# Patient Record
Sex: FEMALE | Race: WHITE | NOT HISPANIC OR LATINO | Employment: OTHER | RURAL
[De-identification: names, ages, dates, MRNs, and addresses within clinical notes are randomized per-mention and may not be internally consistent; named-entity substitution may affect disease eponyms.]

---

## 2020-04-05 ENCOUNTER — HISTORICAL (OUTPATIENT)
Dept: ADMINISTRATIVE | Facility: HOSPITAL | Age: 50
End: 2020-04-05

## 2020-04-05 LAB
APTT PPP: 31 SECONDS (ref 25.2–37.3)
BASOPHILS # BLD AUTO: 0.06 X10E3/UL (ref 0–0.2)
BASOPHILS NFR BLD AUTO: 0.6 % (ref 0–1)
BUN SERPL-MCNC: 20 MG/DL (ref 7–18)
CALCIUM SERPL-MCNC: 8.3 MG/DL (ref 8.5–10.1)
CHLORIDE SERPL-SCNC: 104 MMOL/L (ref 98–107)
CK MB SERPL-MCNC: 1.5 NG/ML (ref 1–3.6)
CK SERPL-CCNC: 133 U/L (ref 26–192)
CO2 SERPL-SCNC: 28 MMOL/L (ref 21–32)
CREAT SERPL-MCNC: 0.75 MG/DL (ref 0.55–1.02)
D DIMER PPP FEU-MCNC: 0.3 UG/ML (ref 0–0.47)
EOSINOPHIL # BLD AUTO: 0.5 X10E3/UL (ref 0–0.5)
EOSINOPHIL NFR BLD AUTO: 5.4 % (ref 1–4)
ERYTHROCYTE [DISTWIDTH] IN BLOOD BY AUTOMATED COUNT: 14.1 % (ref 11.5–14.5)
GLUCOSE SERPL-MCNC: 256 MG/DL (ref 74–106)
HCT VFR BLD AUTO: 42.1 % (ref 38–47)
HGB BLD-MCNC: 12.5 G/DL (ref 12–16)
IMM GRANULOCYTES # BLD AUTO: 0.04 X10E3/UL (ref 0–0.04)
IMM GRANULOCYTES NFR BLD: 0.4 % (ref 0–0.4)
INR BLD: 0.99 (ref 0–3.3)
LYMPHOCYTES # BLD AUTO: 2.4 X10E3/UL (ref 1–4.8)
LYMPHOCYTES NFR BLD AUTO: 25.8 % (ref 27–41)
MAGNESIUM SERPL-MCNC: 1.7 MG/DL (ref 1.7–2.3)
MCH RBC QN AUTO: 29.4 PG (ref 27–31)
MCHC RBC AUTO-ENTMCNC: 29.7 G/DL (ref 32–36)
MCV RBC AUTO: 99.1 FL (ref 80–96)
MONOCYTES # BLD AUTO: 0.68 X10E3/UL (ref 0–0.8)
MONOCYTES NFR BLD AUTO: 7.3 % (ref 2–6)
MPC BLD CALC-MCNC: 11.4 FL (ref 9.4–12.4)
MYOGLOBIN SERPL-MCNC: 42 NG/ML (ref 13–71)
NEUTROPHILS # BLD AUTO: 5.64 X10E3/UL (ref 1.8–7.7)
NEUTROPHILS NFR BLD AUTO: 60.5 % (ref 53–65)
NRBC # BLD AUTO: 0 X10E3/UL (ref 0–0)
NRBC, AUTO (.00): 0 /100 (ref 0–0)
PLATELET # BLD AUTO: 233 X10E3/UL (ref 150–400)
POTASSIUM SERPL-SCNC: 4.3 MMOL/L (ref 3.5–5.1)
PROTHROMBIN TIME: 13.2 SECONDS (ref 11.7–14.7)
RBC # BLD AUTO: 4.25 X10E6/UL (ref 4.2–5.4)
SODIUM SERPL-SCNC: 139 MMOL/L (ref 136–145)
TROPONIN I SERPL-MCNC: <0.017 NG/ML (ref 0–0.06)
WBC # BLD AUTO: 9.32 X10E3/UL (ref 4.5–11)

## 2023-09-20 LAB
LEFT EYE DM RETINOPATHY: NEGATIVE
RIGHT EYE DM RETINOPATHY: NEGATIVE

## 2024-02-05 ENCOUNTER — HOSPITAL ENCOUNTER (EMERGENCY)
Facility: HOSPITAL | Age: 54
Discharge: HOME | End: 2024-02-05
Attending: STUDENT IN AN ORGANIZED HEALTH CARE EDUCATION/TRAINING PROGRAM
Payer: MEDICARE

## 2024-02-05 VITALS
TEMPERATURE: 98 F | BODY MASS INDEX: 43.4 KG/M2 | HEIGHT: 69 IN | RESPIRATION RATE: 20 BRPM | SYSTOLIC BLOOD PRESSURE: 119 MMHG | HEART RATE: 91 BPM | WEIGHT: 293 LBS | DIASTOLIC BLOOD PRESSURE: 95 MMHG | OXYGEN SATURATION: 96 %

## 2024-02-05 DIAGNOSIS — Z72.0 TOBACCO ABUSE: ICD-10-CM

## 2024-02-05 DIAGNOSIS — R05.9 COUGH: ICD-10-CM

## 2024-02-05 DIAGNOSIS — J44.1 COPD WITH ACUTE EXACERBATION: Primary | ICD-10-CM

## 2024-02-05 LAB
ALBUMIN SERPL BCP-MCNC: 3.4 G/DL (ref 3.5–5.2)
ALP SERPL-CCNC: 109 U/L (ref 55–135)
ALT SERPL W/O P-5'-P-CCNC: 16 U/L (ref 10–44)
ANION GAP SERPL CALC-SCNC: 13 MMOL/L (ref 8–16)
AST SERPL-CCNC: 12 U/L (ref 10–40)
BASOPHILS # BLD AUTO: 0.09 K/UL (ref 0–0.2)
BASOPHILS NFR BLD: 0.8 % (ref 0–1.9)
BILIRUB SERPL-MCNC: 0.3 MG/DL (ref 0.1–1)
BUN SERPL-MCNC: 29 MG/DL (ref 6–20)
CALCIUM SERPL-MCNC: 9.2 MG/DL (ref 8.7–10.5)
CHLORIDE SERPL-SCNC: 105 MMOL/L (ref 95–110)
CO2 SERPL-SCNC: 23 MMOL/L (ref 23–29)
CREAT SERPL-MCNC: 0.9 MG/DL (ref 0.5–1.4)
DIFFERENTIAL METHOD BLD: NORMAL
EOSINOPHIL # BLD AUTO: 0.4 K/UL (ref 0–0.5)
EOSINOPHIL NFR BLD: 3.2 % (ref 0–8)
ERYTHROCYTE [DISTWIDTH] IN BLOOD BY AUTOMATED COUNT: 14.3 % (ref 11.5–14.5)
EST. GFR  (NO RACE VARIABLE): >60 ML/MIN/1.73 M^2
GLUCOSE SERPL-MCNC: 207 MG/DL (ref 70–110)
GROUP A STREP, MOLECULAR: NEGATIVE
HCT VFR BLD AUTO: 39.3 % (ref 37–48.5)
HGB BLD-MCNC: 13.2 G/DL (ref 12–16)
IMM GRANULOCYTES # BLD AUTO: 0.04 K/UL (ref 0–0.04)
IMM GRANULOCYTES NFR BLD AUTO: 0.3 % (ref 0–0.5)
INFLUENZA A, MOLECULAR: NEGATIVE
INFLUENZA B, MOLECULAR: NEGATIVE
LYMPHOCYTES # BLD AUTO: 3.7 K/UL (ref 1–4.8)
LYMPHOCYTES NFR BLD: 32.1 % (ref 18–48)
MCH RBC QN AUTO: 28.8 PG (ref 27–31)
MCHC RBC AUTO-ENTMCNC: 33.6 G/DL (ref 32–36)
MCV RBC AUTO: 86 FL (ref 82–98)
MONOCYTES # BLD AUTO: 0.9 K/UL (ref 0.3–1)
MONOCYTES NFR BLD: 7.6 % (ref 4–15)
NEUTROPHILS # BLD AUTO: 6.4 K/UL (ref 1.8–7.7)
NEUTROPHILS NFR BLD: 56 % (ref 38–73)
NRBC BLD-RTO: 0 /100 WBC
PLATELET # BLD AUTO: 320 K/UL (ref 150–450)
PMV BLD AUTO: 10.7 FL (ref 9.2–12.9)
POTASSIUM SERPL-SCNC: 4.1 MMOL/L (ref 3.5–5.1)
PROT SERPL-MCNC: 6.8 G/DL (ref 6–8.4)
RBC # BLD AUTO: 4.58 M/UL (ref 4–5.4)
SARS-COV-2 RDRP RESP QL NAA+PROBE: NEGATIVE
SODIUM SERPL-SCNC: 141 MMOL/L (ref 136–145)
SPECIMEN SOURCE: NORMAL
WBC # BLD AUTO: 11.43 K/UL (ref 3.9–12.7)

## 2024-02-05 PROCEDURE — 99284 EMERGENCY DEPT VISIT MOD MDM: CPT | Mod: 25

## 2024-02-05 PROCEDURE — 85025 COMPLETE CBC W/AUTO DIFF WBC: CPT | Performed by: NURSE PRACTITIONER

## 2024-02-05 PROCEDURE — 87651 STREP A DNA AMP PROBE: CPT | Performed by: NURSE PRACTITIONER

## 2024-02-05 PROCEDURE — 87502 INFLUENZA DNA AMP PROBE: CPT | Performed by: NURSE PRACTITIONER

## 2024-02-05 PROCEDURE — 80053 COMPREHEN METABOLIC PANEL: CPT | Performed by: NURSE PRACTITIONER

## 2024-02-05 PROCEDURE — 94640 AIRWAY INHALATION TREATMENT: CPT | Mod: XB

## 2024-02-05 PROCEDURE — 25000003 PHARM REV CODE 250: Performed by: NURSE PRACTITIONER

## 2024-02-05 PROCEDURE — 25000242 PHARM REV CODE 250 ALT 637 W/ HCPCS: Performed by: NURSE PRACTITIONER

## 2024-02-05 PROCEDURE — 71046 X-RAY EXAM CHEST 2 VIEWS: CPT | Mod: 26,,, | Performed by: RADIOLOGY

## 2024-02-05 PROCEDURE — 96374 THER/PROPH/DIAG INJ IV PUSH: CPT

## 2024-02-05 PROCEDURE — U0002 COVID-19 LAB TEST NON-CDC: HCPCS | Performed by: NURSE PRACTITIONER

## 2024-02-05 PROCEDURE — 71046 X-RAY EXAM CHEST 2 VIEWS: CPT | Mod: TC

## 2024-02-05 PROCEDURE — 63600175 PHARM REV CODE 636 W HCPCS: Performed by: NURSE PRACTITIONER

## 2024-02-05 RX ORDER — DOXYCYCLINE 100 MG/1
100 CAPSULE ORAL 2 TIMES DAILY
Qty: 20 CAPSULE | Refills: 0 | Status: SHIPPED | OUTPATIENT
Start: 2024-02-05 | End: 2024-02-15

## 2024-02-05 RX ORDER — ALBUTEROL SULFATE 0.83 MG/ML
2.5 SOLUTION RESPIRATORY (INHALATION)
Status: COMPLETED | OUTPATIENT
Start: 2024-02-05 | End: 2024-02-05

## 2024-02-05 RX ORDER — BENZONATATE 200 MG/1
200 CAPSULE ORAL 3 TIMES DAILY PRN
Qty: 60 CAPSULE | Refills: 1 | OUTPATIENT
Start: 2024-02-05 | End: 2024-03-09

## 2024-02-05 RX ORDER — HEPARIN SODIUM,PORCINE/PF 10 UNIT/ML
SYRINGE (ML) INTRAVENOUS
Status: DISCONTINUED
Start: 2024-02-05 | End: 2024-02-05 | Stop reason: WASHOUT

## 2024-02-05 RX ORDER — HEPARIN 100 UNIT/ML
5 SYRINGE INTRAVENOUS
Status: COMPLETED | OUTPATIENT
Start: 2024-02-05 | End: 2024-02-05

## 2024-02-05 RX ORDER — DOXYCYCLINE HYCLATE 100 MG
100 TABLET ORAL
Status: COMPLETED | OUTPATIENT
Start: 2024-02-05 | End: 2024-02-05

## 2024-02-05 RX ORDER — IPRATROPIUM BROMIDE AND ALBUTEROL SULFATE 2.5; .5 MG/3ML; MG/3ML
3 SOLUTION RESPIRATORY (INHALATION)
Status: COMPLETED | OUTPATIENT
Start: 2024-02-05 | End: 2024-02-05

## 2024-02-05 RX ADMIN — DOXYCYCLINE HYCLATE 100 MG: 100 TABLET, COATED ORAL at 07:02

## 2024-02-05 RX ADMIN — IPRATROPIUM BROMIDE AND ALBUTEROL SULFATE 3 ML: .5; 2.5 SOLUTION RESPIRATORY (INHALATION) at 05:02

## 2024-02-05 RX ADMIN — HEPARIN 500 UNITS: 100 SYRINGE at 07:02

## 2024-02-05 RX ADMIN — ALBUTEROL SULFATE 2.5 MG: 2.5 SOLUTION RESPIRATORY (INHALATION) at 06:02

## 2024-02-05 NOTE — ED PROVIDER NOTES
Encounter Date: 2024       History     Chief Complaint   Patient presents with    General Illness     Congested and productive cough with green sputum x 5 days.     POV to ED with family.  Patient complains of cough congestion for 3-4 days.  History of COPD.  No relief with her inhaler use at home.  Denies chest pain or shortness of breath.  States that she continues to wheeze and cough.  She denies fever or vomiting.  No other complaints.  Continues tobacco use    The history is provided by the patient.     Review of patient's allergies indicates:   Allergen Reactions    Aspirin     Bactrim [sulfamethoxazole-trimethoprim]     Clozaril [clozapine]     Compazine [prochlorperazine edisylate]     Haldol [haloperidol lactate]     Imitrex [sumatriptan succinate]     Keflex [cephalexin]     Medrol [methylprednisolone]     Penicillins      Past Medical History:   Diagnosis Date    Arthritis     Diabetes     Gall bladder disease     Hypertension     Liver disease     Lung disease     Neuropathy     Psychiatric illness     Reflux esophagitis      Past Surgical History:   Procedure Laterality Date     SECTION      KNEE SURGERY       No family history on file.  Social History     Tobacco Use    Smoking status: Every Day     Review of Systems   Constitutional:  Negative for chills and fever.   Respiratory:  Positive for cough and wheezing. Negative for shortness of breath.         Green productive sputum   Cardiovascular:  Negative for chest pain.   Gastrointestinal:  Negative for abdominal pain, nausea and vomiting.   All other systems reviewed and are negative.      Physical Exam     Initial Vitals [24 1639]   BP Pulse Resp Temp SpO2   (!) 119/95 100 20 97.9 °F (36.6 °C) 97 %      MAP       --         Physical Exam    Nursing note and vitals reviewed.  Constitutional: She appears well-developed and well-nourished. No distress.   HENT:   Head: Normocephalic and atraumatic.   Mouth/Throat: Oropharynx  is clear and moist.   Eyes: Pupils are equal, round, and reactive to light.   Neck:   Normal range of motion.  Cardiovascular:  Normal rate and regular rhythm.           Pulmonary/Chest:   Scattered coarse lung sounds diffusely, wheezing expiratory in nature diffusely.  No work of breathing no decreased lung sounds.  Bronchospasm with deep breaths   Abdominal: Abdomen is soft. There is no abdominal tenderness.   Musculoskeletal:         General: Normal range of motion.      Cervical back: Normal range of motion.     Neurological: She is alert and oriented to person, place, and time. She has normal strength. GCS score is 15. GCS eye subscore is 4. GCS verbal subscore is 5. GCS motor subscore is 6.   Skin: Skin is warm and dry. Capillary refill takes 2 to 3 seconds.   Psychiatric: She has a normal mood and affect. Thought content normal.         ED Course   Procedures  Labs Reviewed   COMPREHENSIVE METABOLIC PANEL - Abnormal; Notable for the following components:       Result Value    Glucose 207 (*)     BUN 29 (*)     Albumin 3.4 (*)     All other components within normal limits   INFLUENZA A & B BY MOLECULAR   GROUP A STREP, MOLECULAR   CBC W/ AUTO DIFFERENTIAL   SARS-COV-2 RNA AMPLIFICATION, QUAL    Narrative:     Is the patient symptomatic?->Yes          Imaging Results              X-Ray Chest PA And Lateral (Final result)  Result time 02/05/24 17:25:07      Final result by Minoo White MD (02/05/24 17:25:07)                   Impression:      No acute findings.      Electronically signed by: Minoo White  Date:    02/05/2024  Time:    17:25               Narrative:    EXAMINATION:  XR CHEST PA AND LATERAL    CLINICAL HISTORY:  Cough, unspecified    TECHNIQUE:  PA and lateral views of the chest were performed.    COMPARISON:  04/05/2020    FINDINGS:  Right chest wall port.  Lungs are clear. No focal consolidation. No pleural effusion. No pneumothorax. Normal heart size.                                     X-Rays:   Independently Interpreted Readings:   Other Readings:  EXAMINATION:  XR CHEST PA AND LATERAL     CLINICAL HISTORY:  Cough, unspecified     TECHNIQUE:  PA and lateral views of the chest were performed.     COMPARISON:  04/05/2020     FINDINGS:  Right chest wall port.  Lungs are clear. No focal consolidation. No pleural effusion. No pneumothorax. Normal heart size.     Impression:     No acute findings.  EXAMINATION:  XR CHEST PA AND LATERAL     CLINICAL HISTORY:  Cough, unspecified     TECHNIQUE:  PA and lateral views of the chest were performed.     COMPARISON:  04/05/2020     FINDINGS:  Right chest wall port.  Lungs are clear. No focal consolidation. No pleural effusion. No pneumothorax. Normal heart size.     Impression:     No acute findings.         Medications   albuterol-ipratropium 2.5 mg-0.5 mg/3 mL nebulizer solution 3 mL (3 mLs Nebulization Given 2/5/24 1724)   albuterol nebulizer solution 2.5 mg (2.5 mg Nebulization Given 2/5/24 1859)   doxycycline tablet 100 mg (100 mg Oral Given 2/5/24 1906)   heparin, porcine (PF) 100 unit/mL injection flush 500 Units (500 Units Intravenous Given 2/5/24 1907)     Medical Decision Making  Presents for evaluation of cough, lab work ordered, chest x-ray ordered, disposition pending  Differentials include not limited to viral illness, bacterial illness, otitis, sinusitis, bronchitis, pneumonia, COPD  Discharged home, diagnosis COPD with acute exacerbation, tobacco use.  DuoNeb treatment, albuterol treatment in the ED. doxycycline p.o. prescription for home use.  To follow up with clinic.  Work note given turn as needed.  She agrees with plan of care.  X-ray negative for pneumonia, lab work unremarkable    Amount and/or Complexity of Data Reviewed  Labs: ordered. Decision-making details documented in ED Course.  Radiology: ordered. Decision-making details documented in ED Course.    Risk  OTC drugs.  Prescription drug management.               ED Course  as of 02/05/24 1945   Mon Feb 05, 2024 1838   Comprehensive Metabolic Panel (CMP)    Final result 02/05 1749  Glucose 207  BUN 29  Albumin 3.4      [CP]      ED Course User Index  [CP] Mahogany Zimmer NP                           Clinical Impression:  Final diagnoses:  [R05.9] Cough  [J44.1] COPD with acute exacerbation (Primary)  [Z72.0] Tobacco abuse          ED Disposition Condition    Discharge Stable          ED Prescriptions       Medication Sig Dispense Start Date End Date Auth. Provider    doxycycline (VIBRAMYCIN) 100 MG Cap Take 1 capsule (100 mg total) by mouth 2 (two) times daily. for 10 days 20 capsule 2/5/2024 2/15/2024 Mahogany Zimmer NP    benzonatate (TESSALON) 200 MG capsule Take 1 capsule (200 mg total) by mouth 3 (three) times daily as needed for Cough. 60 capsule 2/5/2024 -- Mhaogany Zimmer NP          Follow-up Information       Follow up With Specialties Details Why Contact [facility]  Call in 3 days               Mahogany Zimmer NP  02/05/24 1747       Mahogany Zimmer NP  02/05/24 1749       Mahogany Zimmer NP  02/05/24 1838       Mahogany Zimmer NP  02/05/24 1945

## 2024-02-05 NOTE — Clinical Note
"Sommer Rosalesyn" Paola was seen and treated in our emergency department on 2/5/2024.  She may return to work on 02/08/2024.       If you have any questions or concerns, please don't hesitate to call.      Mahogany Zimmer NP"

## 2024-02-06 NOTE — DISCHARGE INSTRUCTIONS
Rest, increase fluids, lots of water and liquids.  Tylenol and or Motrin as needed if not contraindicated.  Call office for recheck.  Return as needed.  Stop smoking

## 2024-02-15 ENCOUNTER — OFFICE VISIT (OUTPATIENT)
Dept: FAMILY MEDICINE | Facility: CLINIC | Age: 54
End: 2024-02-15
Payer: MEDICARE

## 2024-02-15 VITALS
DIASTOLIC BLOOD PRESSURE: 80 MMHG | RESPIRATION RATE: 18 BRPM | OXYGEN SATURATION: 97 % | BODY MASS INDEX: 43.86 KG/M2 | HEART RATE: 86 BPM | SYSTOLIC BLOOD PRESSURE: 116 MMHG | HEIGHT: 69 IN

## 2024-02-15 DIAGNOSIS — E11.65 TYPE 2 DIABETES MELLITUS WITH HYPERGLYCEMIA, WITH LONG-TERM CURRENT USE OF INSULIN: ICD-10-CM

## 2024-02-15 DIAGNOSIS — J44.9 CHRONIC OBSTRUCTIVE PULMONARY DISEASE, UNSPECIFIED COPD TYPE: ICD-10-CM

## 2024-02-15 DIAGNOSIS — I15.2 HYPERTENSION ASSOCIATED WITH TYPE 2 DIABETES MELLITUS: Primary | ICD-10-CM

## 2024-02-15 DIAGNOSIS — T78.40XA ALLERGY, INITIAL ENCOUNTER: ICD-10-CM

## 2024-02-15 DIAGNOSIS — Z85.42 HISTORY OF UTERINE CANCER: ICD-10-CM

## 2024-02-15 DIAGNOSIS — Z23 ENCOUNTER FOR VACCINATION: ICD-10-CM

## 2024-02-15 DIAGNOSIS — E11.59 HYPERTENSION ASSOCIATED WITH TYPE 2 DIABETES MELLITUS: Primary | ICD-10-CM

## 2024-02-15 DIAGNOSIS — Z98.890 S/P THYROID SURGERY: ICD-10-CM

## 2024-02-15 DIAGNOSIS — Z11.59 ENCOUNTER FOR SCREENING FOR VIRAL DISEASE: ICD-10-CM

## 2024-02-15 DIAGNOSIS — Z12.31 ENCOUNTER FOR SCREENING MAMMOGRAM FOR MALIGNANT NEOPLASM OF BREAST: ICD-10-CM

## 2024-02-15 DIAGNOSIS — E66.01 CLASS 3 OBESITY: ICD-10-CM

## 2024-02-15 DIAGNOSIS — R93.89 ABNORMAL RADIOGRAPHIC EXAMINATION: ICD-10-CM

## 2024-02-15 DIAGNOSIS — F25.1 SCHIZOAFFECTIVE DISORDER, DEPRESSIVE TYPE: ICD-10-CM

## 2024-02-15 DIAGNOSIS — Z79.4 TYPE 2 DIABETES MELLITUS WITH HYPERGLYCEMIA, WITH LONG-TERM CURRENT USE OF INSULIN: ICD-10-CM

## 2024-02-15 PROBLEM — E66.813 CLASS 3 OBESITY: Status: ACTIVE | Noted: 2024-02-15

## 2024-02-15 PROCEDURE — 99999 PR PBB SHADOW E&M-EST. PATIENT-LVL III: CPT | Mod: PBBFAC,,, | Performed by: STUDENT IN AN ORGANIZED HEALTH CARE EDUCATION/TRAINING PROGRAM

## 2024-02-15 PROCEDURE — 1159F MED LIST DOCD IN RCRD: CPT | Mod: CPTII,S$GLB,, | Performed by: STUDENT IN AN ORGANIZED HEALTH CARE EDUCATION/TRAINING PROGRAM

## 2024-02-15 PROCEDURE — 3008F BODY MASS INDEX DOCD: CPT | Mod: CPTII,S$GLB,, | Performed by: STUDENT IN AN ORGANIZED HEALTH CARE EDUCATION/TRAINING PROGRAM

## 2024-02-15 PROCEDURE — 4010F ACE/ARB THERAPY RXD/TAKEN: CPT | Mod: CPTII,S$GLB,, | Performed by: STUDENT IN AN ORGANIZED HEALTH CARE EDUCATION/TRAINING PROGRAM

## 2024-02-15 PROCEDURE — 1160F RVW MEDS BY RX/DR IN RCRD: CPT | Mod: CPTII,S$GLB,, | Performed by: STUDENT IN AN ORGANIZED HEALTH CARE EDUCATION/TRAINING PROGRAM

## 2024-02-15 PROCEDURE — 3079F DIAST BP 80-89 MM HG: CPT | Mod: CPTII,S$GLB,, | Performed by: STUDENT IN AN ORGANIZED HEALTH CARE EDUCATION/TRAINING PROGRAM

## 2024-02-15 PROCEDURE — 3074F SYST BP LT 130 MM HG: CPT | Mod: CPTII,S$GLB,, | Performed by: STUDENT IN AN ORGANIZED HEALTH CARE EDUCATION/TRAINING PROGRAM

## 2024-02-15 PROCEDURE — 99386 PREV VISIT NEW AGE 40-64: CPT | Mod: 95,S$GLB,, | Performed by: STUDENT IN AN ORGANIZED HEALTH CARE EDUCATION/TRAINING PROGRAM

## 2024-02-15 RX ORDER — GABAPENTIN 600 MG/1
600 TABLET ORAL 3 TIMES DAILY
Qty: 90 TABLET | Refills: 11 | Status: SHIPPED | OUTPATIENT
Start: 2024-02-15 | End: 2024-05-31 | Stop reason: SDUPTHER

## 2024-02-15 RX ORDER — IPRATROPIUM BROMIDE AND ALBUTEROL SULFATE 2.5; .5 MG/3ML; MG/3ML
3 SOLUTION RESPIRATORY (INHALATION) EVERY 6 HOURS PRN
Qty: 75 ML | Refills: 0 | Status: SHIPPED | OUTPATIENT
Start: 2024-02-15 | End: 2025-02-14

## 2024-02-15 RX ORDER — TIRZEPATIDE 2.5 MG/.5ML
2.5 INJECTION, SOLUTION SUBCUTANEOUS
Qty: 4 PEN | Refills: 0 | Status: SHIPPED | OUTPATIENT
Start: 2024-02-15 | End: 2024-03-15

## 2024-02-15 RX ORDER — ZOSTER VACCINE RECOMBINANT, ADJUVANTED 50 MCG/0.5
0.5 KIT INTRAMUSCULAR ONCE
Qty: 1 EACH | Refills: 0 | Status: SHIPPED | OUTPATIENT
Start: 2024-02-15 | End: 2024-02-15

## 2024-02-15 RX ORDER — FLUTICASONE PROPIONATE 50 MCG
1 SPRAY, SUSPENSION (ML) NASAL DAILY
Qty: 18.4 ML | Refills: 0 | Status: SHIPPED | OUTPATIENT
Start: 2024-02-15

## 2024-02-15 RX ORDER — BUDESONIDE, GLYCOPYRROLATE, AND FORMOTEROL FUMARATE 160; 9; 4.8 UG/1; UG/1; UG/1
2 AEROSOL, METERED RESPIRATORY (INHALATION) 2 TIMES DAILY
Qty: 8.5 G | Refills: 3 | Status: SHIPPED | OUTPATIENT
Start: 2024-02-15

## 2024-02-15 RX ORDER — METHOCARBAMOL 500 MG/1
500 TABLET, FILM COATED ORAL 4 TIMES DAILY
Qty: 40 TABLET | Refills: 0 | Status: SHIPPED | OUTPATIENT
Start: 2024-02-15 | End: 2024-02-25

## 2024-02-15 RX ORDER — NEBULIZER AND COMPRESSOR
1 EACH MISCELLANEOUS 2 TIMES DAILY
Qty: 1 EACH | Refills: 0 | Status: SHIPPED | OUTPATIENT
Start: 2024-02-15

## 2024-02-15 RX ORDER — LISINOPRIL 10 MG/1
10 TABLET ORAL DAILY
Qty: 90 TABLET | Refills: 3 | Status: SHIPPED | OUTPATIENT
Start: 2024-02-15 | End: 2024-05-31 | Stop reason: SDUPTHER

## 2024-02-15 RX ORDER — INSULIN PUMP SYRINGE, 3 ML
EACH MISCELLANEOUS
Qty: 1 EACH | Refills: 0 | Status: SHIPPED | OUTPATIENT
Start: 2024-02-15

## 2024-02-15 RX ORDER — INSULIN GLARGINE 100 [IU]/ML
10 INJECTION, SOLUTION SUBCUTANEOUS DAILY
Qty: 3 ML | Refills: 11 | Status: SHIPPED | OUTPATIENT
Start: 2024-02-15 | End: 2024-05-31

## 2024-02-15 RX ORDER — LANCETS
EACH MISCELLANEOUS
Qty: 100 EACH | Refills: 11 | Status: SHIPPED | OUTPATIENT
Start: 2024-02-15

## 2024-02-15 NOTE — PROGRESS NOTES
[facility] Note    Subjective:    The HPI and pertinent ROS is included in the Diagnostic Impression Remarks section at the end of the note. Please see below for further details. Chief complaint is at end of note.     Sommer is a pleasant intelligent patient who is here for evaluation.     Modified Medications    Modified Medication Previous Medication    INSULIN GLARGINE (LANTUS U-100 INSULIN) 100 UNIT/ML INJECTION INSULIN GLARGINE,HUM.REC.ANLOG (LANTUS SUBQ)       Inject 10 Units into the skin once daily.    Inject 85 Units into the skin.       Data reviewed 274}  Previous medical records reviewed and summarized in plan section at end of note.      If you are due for any health screening(s) below please notify me so we can arrange them to be ordered and scheduled. Most healthy patients at your age complete them, but you are free to accept or refuse. If you can't do it, I'll definitely understand. If you can, I'd certainly appreciate it!     Tests to Keep You Healthy    Mammogram: ORDERED BUT NOT SCHEDULED  Colon Cancer Screening: Met on 2022  Cervical Cancer Screening: DUE      The following portions of the patient's history were reviewed and updated as appropriate: allergies, current medications, past family history, past medical history, past social history, past surgical history and problem list.    She  has a past medical history of Arthritis, Diabetes, Gall bladder disease, Hypertension, Liver disease, Lung disease, Neuropathy, Psychiatric illness, and Reflux esophagitis.  She  has a past surgical history that includes Knee surgery () and  section ().    She  reports that she has been smoking. She does not have any smokeless tobacco history on file.  She family history is not on file.    Review of patient's allergies indicates:   Allergen Reactions    Aspirin     Bactrim [sulfamethoxazole-trimethoprim]     Clozaril [clozapine]     Compazine [prochlorperazine edisylate]   "   Haldol [haloperidol lactate]     Imitrex [sumatriptan succinate]     Keflex [cephalexin]     Medrol [methylprednisolone]     Penicillins        Tobacco Use: High Risk (2/15/2024)    Patient History     Smoking Tobacco Use: Every Day     Smokeless Tobacco Use: Unknown     Passive Exposure: Not on file     Physical Examination  General appearance: alert, cooperative, no distress  Neck: no thyromegaly, no neck stiffness  Lungs: clear to auscultation, no wheezes, rales or rhonchi, symmetric air entry  Heart: normal rate, regular rhythm, normal S1, S2, no murmurs, rubs, clicks or gallops  Abdomen: soft, nontender, nondistended, no rigidity, rebound, or guarding.   Back: no point tenderness over spine  Extremities: peripheral pulses normal, no unilateral leg swelling or calf tenderness   Neurological:alert, oriented, normal speech, no new focal findings or movement disorder noted from baseline    BP Readings from Last 3 Encounters:   02/15/24 116/80   02/05/24 (!) 119/95   08/10/16 114/77     Wt Readings from Last 3 Encounters:   02/05/24 134.7 kg (297 lb)   09/28/16 (!) 150.6 kg (332 lb)   08/10/16 (!) 150.6 kg (332 lb)     /80 (BP Location: Left arm, Patient Position: Sitting, BP Method: Large (Manual))   Pulse 86   Resp 18   Ht 5' 9" (1.753 m)   SpO2 97%   BMI 43.86 kg/m²    274}  Laboratory: I have reviewed old labs below:    274}    Lab Results   Component Value Date    WBC 11.43 02/05/2024    HGB 13.2 02/05/2024    HCT 39.3 02/05/2024    MCV 86 02/05/2024     02/05/2024     02/05/2024    K 4.1 02/05/2024     02/05/2024    CALCIUM 9.2 02/05/2024    CO2 23 02/05/2024     (H) 02/05/2024    BUN 29 (H) 02/05/2024    CREATININE 0.9 02/05/2024    EGFRNORACEVR >60.0 02/05/2024    ANIONGAP 13 02/05/2024    PROT 6.8 02/05/2024    ALBUMIN 3.4 (L) 02/05/2024    BILITOT 0.3 02/05/2024    ALKPHOS 109 02/05/2024    ALT 16 02/05/2024    AST 12 02/05/2024    INR 0.99 04/05/2020      Lab " "reviewed by me: Particular labs of significance that I will monitor, workup, or treat to improve are mentioned below in diagnostic impression remarks.    Imaging/EKG: I have reviewed the pertinent results and my findings are noted in remarks.  274}    CC: No chief complaint on file.       274}    Assessment/Plan  Sommer Guevara is a 53 y.o. female who presents to clinic with:  1. Hypertension associated with type 2 diabetes mellitus    2. Class 3 obesity    3. Schizoaffective disorder, depressive type    4. Chronic obstructive pulmonary disease, unspecified COPD type    5. Type 2 diabetes mellitus with hyperglycemia, with long-term current use of insulin    6. BMI 40.0-44.9, adult    7. S/P thyroid surgery    8. Abnormal radiographic examination    9. Allergy, initial encounter    10. Encounter for vaccination    11. Encounter for screening mammogram for malignant neoplasm of breast    12. Encounter for screening for viral disease    13. History of uterine cancer       274}  Diagnostic Impression Remarks + HPI     Documentation entered by me for this encounter may have been done in part using speech-recognition technology. Although I have made an effort to ensure accuracy, "sound like" errors may exist and should be interpreted in context.     COPD: not well controlled. Using nebulizer bid. Will escalate treatment  HTN: advise home monitoring. Controlled  Schizoaffective: followed by psychiatrist  DM: not checking BG at home. Unsure of last A1c. Will refill supplies and medications. Start mounjaro and refer to DM education  S/p thyroid surgery: biopsy benign. Ok to start glp1   H/o uterine and ovarian cancer: advise f/u w/ obgyn    This is the extent of this pleasant patient's concerns at this present time. She did not feel chest pain upon exertion, dyspnea, nausea, vomiting, diaphoresis, or syncope. No pleuritic chest pain, unilateral leg swelling, calf tenderness, or calf pain. Negative for unintentional weight " loss night sweats, hematuria, and fevers. Sommer will return to clinic in a few months for further workup and reassessment or sooner as needed. She was instructed to call the clinic or go to the emergency department or urgent care immediately if her symptoms do not improve, worsens, or if any new symptoms develop. As we discussed that symptoms could worsen over the next 24 hours she was advised that if any increased swelling, pain, or numbness arise to go immediately to the ED. Patient knows to call any time if an emergency arises. Shared decision making occurred and she verbalized understanding in agreement with this plan. I discussed imaging findings, diagnosis, possibilities, treatment options, medications, risks, and benefits. She had many questions regarding the options and long-term effects. All questions were answered. She expressed understanding after counseling regarding the diagnosis and recommendations. She was capable and demonstrated competence with understanding of these options. Shared decision making was performed resulting in her choosing the current treatment plan. Patient handout was given with instructions and recommendations. Advised the patient that if they become pregnant to alert us immediately to assess for medication changes. Having a healthy weight can decrease the risk of 13 cancers and is an important goal. I also discussed the importance of close follow up to discuss labs, change or modify her medications if needed, monitor side effects, and further evaluation of medical problems.     Additional workup planned: see labs ordered below.    See below for labs and meds ordered with associated diagnosis      1. Hypertension associated with type 2 diabetes mellitus  - lisinopriL 10 MG tablet; Take 1 tablet (10 mg total) by mouth once daily.  Dispense: 90 tablet; Refill: 3    2. Class 3 obesity  - gabapentin (NEURONTIN) 600 MG tablet; Take 1 tablet (600 mg total) by mouth 3 (three) times  daily.  Dispense: 90 tablet; Refill: 11  - methocarbamoL (ROBAXIN) 500 MG Tab; Take 1 tablet (500 mg total) by mouth 4 (four) times daily. for 10 days  Dispense: 40 tablet; Refill: 0    3. Schizoaffective disorder, depressive type    4. Chronic obstructive pulmonary disease, unspecified COPD type  - albuterol-ipratropium (DUO-NEB) 2.5 mg-0.5 mg/3 mL nebulizer solution; Take 3 mLs by nebulization every 6 (six) hours as needed for Wheezing. Rescue  Dispense: 75 mL; Refill: 0  - nebulizer and compressor Alena; 1 each by Misc.(Non-Drug; Combo Route) route 2 (two) times a day.  Dispense: 1 each; Refill: 0  - budesonide-glycopyr-formoterol (BREZTRI AEROSPHERE) 160-9-4.8 mcg/actuation HFAA; Inhale 2 puffs into the lungs 2 (two) times daily.  Dispense: 8.5 g; Refill: 3    5. Type 2 diabetes mellitus with hyperglycemia, with long-term current use of insulin  - insulin glargine (LANTUS U-100 INSULIN) 100 unit/mL injection; Inject 10 Units into the skin once daily.  Dispense: 3 mL; Refill: 11  - blood-glucose meter kit; To check BG 2 times daily, to use with insurance preferred meter  Dispense: 1 each; Refill: 0  - lancets Misc; To check BG 2 times daily, to use with insurance preferred meter  Dispense: 100 each; Refill: 11  - blood sugar diagnostic Strp; To check BG 2 times daily, to use with insurance preferred meter  Dispense: 100 each; Refill: 11  - Hemoglobin A1C; Future  - Microalbumin/Creatinine Ratio, Urine; Future  - Lipid Panel; Future  - Ambulatory referral/consult to Diabetes Education; Future  - tirzepatide (MOUNJARO) 2.5 mg/0.5 mL PnIj; Inject 2.5 mg into the skin every 7 days.  Dispense: 4 Pen; Refill: 0    6. BMI 40.0-44.9, adult  - Vitamin D 25-Hydroxy; Future    7. S/P thyroid surgery  - US Soft Tissue Head Neck; Future    8. Abnormal radiographic examination  - TSH; Future    9. Allergy, initial encounter  - fluticasone propionate (FLONASE) 50 mcg/actuation nasal spray; 1 spray (50 mcg total) by Each Nostril  route once daily.  Dispense: 18.4 mL; Refill: 0    10. Encounter for vaccination  - varicella-zoster [id number], PF, (SHINGRIX, PF,) 50 mcg/0.5 mL injection; Inject 0.5 mLs into the muscle once. for 1 dose  Dispense: 1 each; Refill: 0    11. Encounter for screening mammogram for malignant neoplasm of breast  - Mammo Digital Screening Bilat w/ Seth; Future    12. Encounter for screening for viral disease  - Hepatitis C antibody; Future  - HIV 1/2 Ag/Ab (4th Gen); Future    13. History of uterine cancer  - Ambulatory referral/consult to Obstetrics / Gynecology; Future      Ivelisse Garland MD   274}    If you are due for any health screening(s) below please notify me so we can arrange them to be ordered and scheduled. Most healthy patients at your age complete them, but you are free to accept or refuse.     If you can't do it, I'll definitely understand. If you can, I'd certainly appreciate it!   Tests to Keep You Healthy    Mammogram: ORDERED BUT NOT SCHEDULED  Colon Cancer Screening: Met on 1/6/2022  Cervical Cancer Screening: DUE

## 2024-02-16 ENCOUNTER — TELEPHONE (OUTPATIENT)
Dept: DIABETES | Facility: CLINIC | Age: 54
End: 2024-02-16

## 2024-02-20 ENCOUNTER — HOSPITAL ENCOUNTER (OUTPATIENT)
Dept: RADIOLOGY | Facility: HOSPITAL | Age: 54
Discharge: HOME | End: 2024-02-20
Attending: STUDENT IN AN ORGANIZED HEALTH CARE EDUCATION/TRAINING PROGRAM
Payer: MEDICARE

## 2024-02-20 DIAGNOSIS — Z98.890 S/P THYROID SURGERY: ICD-10-CM

## 2024-02-20 PROCEDURE — 76536 US EXAM OF HEAD AND NECK: CPT | Mod: TC

## 2024-02-20 PROCEDURE — 76536 US EXAM OF HEAD AND NECK: CPT | Mod: 26,,, | Performed by: RADIOLOGY

## 2024-03-02 ENCOUNTER — HOSPITAL ENCOUNTER (EMERGENCY)
Facility: HOSPITAL | Age: 54
Discharge: HOME | End: 2024-03-02
Attending: EMERGENCY MEDICINE
Payer: MEDICARE

## 2024-03-02 VITALS
HEIGHT: 69 IN | SYSTOLIC BLOOD PRESSURE: 109 MMHG | RESPIRATION RATE: 20 BRPM | HEART RATE: 93 BPM | TEMPERATURE: 98 F | OXYGEN SATURATION: 96 % | BODY MASS INDEX: 42.06 KG/M2 | DIASTOLIC BLOOD PRESSURE: 81 MMHG | WEIGHT: 284 LBS

## 2024-03-02 DIAGNOSIS — M54.31 SCIATICA OF RIGHT SIDE: Primary | ICD-10-CM

## 2024-03-02 PROCEDURE — 96372 THER/PROPH/DIAG INJ SC/IM: CPT | Performed by: NURSE PRACTITIONER

## 2024-03-02 PROCEDURE — 25000003 PHARM REV CODE 250: Performed by: NURSE PRACTITIONER

## 2024-03-02 PROCEDURE — 99284 EMERGENCY DEPT VISIT MOD MDM: CPT | Mod: 25

## 2024-03-02 PROCEDURE — 63600175 PHARM REV CODE 636 W HCPCS: Performed by: NURSE PRACTITIONER

## 2024-03-02 RX ORDER — METHOCARBAMOL 500 MG/1
500 TABLET, FILM COATED ORAL 4 TIMES DAILY PRN
Qty: 30 TABLET | Refills: 0 | Status: SHIPPED | OUTPATIENT
Start: 2024-03-02 | End: 2024-03-15

## 2024-03-02 RX ORDER — HYDROCODONE BITARTRATE AND ACETAMINOPHEN 5; 325 MG/1; MG/1
1 TABLET ORAL EVERY 8 HOURS PRN
Qty: 12 TABLET | Refills: 0 | Status: SHIPPED | OUTPATIENT
Start: 2024-03-02 | End: 2024-03-05 | Stop reason: SDUPTHER

## 2024-03-02 RX ORDER — ORPHENADRINE CITRATE 30 MG/ML
60 INJECTION INTRAMUSCULAR; INTRAVENOUS
Status: COMPLETED | OUTPATIENT
Start: 2024-03-02 | End: 2024-03-02

## 2024-03-02 RX ORDER — HYDROCODONE BITARTRATE AND ACETAMINOPHEN 10; 325 MG/1; MG/1
1 TABLET ORAL
Status: COMPLETED | OUTPATIENT
Start: 2024-03-02 | End: 2024-03-02

## 2024-03-02 RX ADMIN — HYDROCODONE BITARTRATE AND ACETAMINOPHEN 1 TABLET: 10; 325 TABLET ORAL at 09:03

## 2024-03-02 RX ADMIN — ORPHENADRINE CITRATE 60 MG: 60 INJECTION INTRAMUSCULAR; INTRAVENOUS at 09:03

## 2024-03-03 NOTE — ED NOTES
Pt reports that she fell 8 weeks ago while getting out of shower at [facility]. Pt reports she has had right hip and leg pain since then. Pt denies going to PCP for this complaint. Pt was noted ambulating into ED lobby with a steady gait. Pt transferred from wheelchair to ED stretcher with no assist. No swelling to right LE noted, positive pulses to pedal, no swelling or discoloration noted.

## 2024-03-03 NOTE — ED PROVIDER NOTES
CHIEF COMPLAINT  Chief Complaint   Patient presents with    Hip Pain     Pt reports she fell 8 weeks ago and has had right side hip and leg pain that has been worsening since fall and unrelieved by home meds and otc meds. Pt reports that she is now having trouble walking, standing and having some tingling from right knee to the pedal. Pt reports unable to get comfortable when laying down.        HISTORY OF PRESENT ILLNESS  Sommer Guevara is a 53 y.o. female pmh of HTN, DM, mental health, living at a group home presenting with right sciatic pain for the past 2 months. She takes gabapentin, robaxin for pain without improvement. She states she normally walks around by herself, for the last couple of days, it hurt too much to walk, pain started from her right lower back, buttock, leg down to her foot. She had an incident hitting right hip to metal commode while getting out of shower room at [place], xray was negative at that time. Patient was able to move right hip, turn to the left side on the bed. No other specific aggravating or relieving factors otherwise.      PAST MEDICAL HISTORY  Past Medical History:   Diagnosis Date    Arthritis     Diabetes     Gall bladder disease     Hypertension     Liver disease     Lung disease     Neuropathy     Psychiatric illness     Reflux esophagitis        CURRENT MEDICATIONS    No current facility-administered medications for this encounter.    Current Outpatient Medications:     albuterol-ipratropium (DUO-NEB) 2.5 mg-0.5 mg/3 mL nebulizer solution, Take 3 mLs by nebulization every 6 (six) hours as needed for Wheezing. Rescue, Disp: 75 mL, Rfl: 0    benzonatate (TESSALON) 200 MG capsule, Take 1 capsule (200 mg total) by mouth 3 (three) times daily as needed for Cough., Disp: 60 capsule, Rfl: 1    blood sugar diagnostic Strp, To check BG 2 times daily, to use with insurance preferred meter, Disp: 100 each, Rfl: 11    blood-glucose meter kit, To check BG 2 times daily, to use with  insurance preferred meter, Disp: 1 each, Rfl: 0    budesonide-glycopyr-formoterol (BREZTRI AEROSPHERE) 160-9-4.8 mcg/actuation HFAA, Inhale 2 puffs into the lungs 2 (two) times daily., Disp: 8.5 g, Rfl: 3    carbamazepine (TEGRETOL XR) 100 MG 12 hr tablet, Take 100 mg by mouth 2 (two) times daily., Disp: , Rfl:     fluticasone propionate (FLONASE) 50 mcg/actuation nasal spray, 1 spray (50 mcg total) by Each Nostril route once daily., Disp: 18.4 mL, Rfl: 0    gabapentin (NEURONTIN) 600 MG tablet, Take 1 tablet (600 mg total) by mouth 3 (three) times daily., Disp: 90 tablet, Rfl: 11    HYDROcodone-acetaminophen (NORCO) 5-325 mg per tablet, Take 1 tablet by mouth every 8 (eight) hours as needed for Pain., Disp: 12 tablet, Rfl: 0    insulin glargine (LANTUS U-100 INSULIN) 100 unit/mL injection, Inject 10 Units into the skin once daily., Disp: 3 mL, Rfl: 11    lancets Misc, To check BG 2 times daily, to use with insurance preferred meter, Disp: 100 each, Rfl: 11    lisinopriL 10 MG tablet, Take 1 tablet (10 mg total) by mouth once daily., Disp: 90 tablet, Rfl: 3    methocarbamoL (ROBAXIN) 500 MG Tab, Take 1 tablet (500 mg total) by mouth 4 (four) times daily as needed (pain)., Disp: 30 tablet, Rfl: 0    nebulizer and compressor Alena, 1 each by Misc.(Non-Drug; Combo Route) route 2 (two) times a day., Disp: 1 each, Rfl: 0    quetiapine (SEROQUEL) 200 MG Tab, Take by mouth., Disp: , Rfl:     RISPERIDONE (RISPERDAL ORAL), Take by mouth., Disp: , Rfl:     sitagliptin (JANUVIA) 100 MG Tab, Take 100 mg by mouth once daily., Disp: , Rfl:     tirzepatide (MOUNJARO) 2.5 mg/0.5 mL PnIj, Inject 2.5 mg into the skin every 7 days., Disp: 4 Pen, Rfl: 0    ALLERGIES    Review of patient's allergies indicates:   Allergen Reactions    Aspirin     Bactrim [sulfamethoxazole-trimethoprim]     Clozaril [clozapine]     Compazine [prochlorperazine edisylate]     Haldol [haloperidol lactate]     Imitrex [sumatriptan succinate]     Keflex  "[cephalexin]     Medrol [methylprednisolone]     Penicillins        SURGICAL HISTORY    Past Surgical History:   Procedure Laterality Date     SECTION      KNEE SURGERY         SOCIAL HISTORY    Social History     Socioeconomic History    Marital status: Single   Tobacco Use    Smoking status: Every Day       FAMILY HISTORY    No family history on file.    REVIEW OF SYSTEMS    Review of Systems   Musculoskeletal:  Positive for back pain, joint pain and myalgias.     All other systems reviewed and are negative    VITAL SIGNS:   /81   Pulse 93   Temp 97.9 °F (36.6 °C) (Oral)   Resp 20   Ht 5' 9" (1.753 m)   Wt 128.8 kg (284 lb)   SpO2 96%   Breastfeeding No   BMI 41.94 kg/m²      Physical Exam  Constitutional:       Appearance: Normal appearance. She is obese.   HENT:      Head: Normocephalic.   Cardiovascular:      Rate and Rhythm: Normal rate.   Pulmonary:      Effort: Pulmonary effort is normal. No respiratory distress.      Breath sounds: Normal breath sounds.   Abdominal:      Palpations: Abdomen is soft.   Musculoskeletal:         General: Normal range of motion.      Lumbar back: Tenderness present. No signs of trauma.        Back:    Skin:     General: Skin is warm.      Capillary Refill: Capillary refill takes less than 2 seconds.   Neurological:      General: No focal deficit present.      Mental Status: She is alert.      GCS: GCS eye subscore is 4. GCS verbal subscore is 5. GCS motor subscore is 6.   Psychiatric:         Attention and Perception: Attention normal.         Mood and Affect: Mood normal.         Speech: Speech normal.       Vitals and nursing note reviewed.     LABS    Labs Reviewed - No data to display      EKG    No results found for this or any previous visit.      RADIOLOGY    No orders to display         PROCEDURES    Procedures    Medications   orphenadrine injection 60 mg (60 mg Intramuscular Given 3/2/24 2135)   HYDROcodone-acetaminophen  mg per " tablet 1 tablet (1 tablet Oral Given 3/2/24 2135)                Medical Decision Making  Sommer Guevara is a 53 y.o. female pmh of HTN, DM, mental health, living at a group home presenting with right sciatic pain for the past 2 months. She takes gabapentin, robaxin for pain without improvement. She states she normally walks around by herself, for the last couple of days, it hurt too much to walk, pain started from her right lower back, buttock, leg down to her foot. She had an incident hitting right hip to metal commode while getting out of shower room at [place], xray was negative at that time. Patient was able to move right hip, turn to the left side on the bed. No other specific aggravating or relieving factors otherwise.    DDX:Fracture, strain, sprain, tendonitis     No recent direct trauma. Imaging deferred  Patient's physical exam was sciatica related pain  Since she was allergic aspirin, a short course of pain medication, muscle relaxant prescribed.       Problems Addressed:  Sciatica of right side: chronic illness or injury with exacerbation, progression, or side effects of treatment    Risk  Prescription drug management.           Discharge Medication List as of 3/2/2024  9:43 PM          Discharge Medication List as of 3/2/2024  9:43 PM        START taking these medications    Details   HYDROcodone-acetaminophen (NORCO) 5-325 mg per tablet Take 1 tablet by mouth every 8 (eight) hours as needed for Pain., Starting Sat 3/2/2024, Normal      methocarbamoL (ROBAXIN) 500 MG Tab Take 1 tablet (500 mg total) by mouth 4 (four) times daily as needed (pain)., Starting Sat 3/2/2024, Normal             I discussed with patient and/or family/caretaker that evaluation in the ED does not suggest any emergent or life threatening medical condition requiring immediate intervention beyond what was provided in the ED, and I believe the patient is safe for discharge.  Regardless, an unremarkable evaluation in the ED does  not preclude the development or presence of a serious or life threatening condition. As such, patient was instructed to return immediately for any worsening or change in current symptoms  Patient agrees with plan of care.    DISPOSITION  Patient discharged to home in stable condition.        FINAL IMPRESSION    1. Sciatica of right side         Lyn Roa NP  03/02/24 9500

## 2024-03-04 ENCOUNTER — OFFICE VISIT (OUTPATIENT)
Dept: FAMILY MEDICINE | Facility: CLINIC | Age: 54
End: 2024-03-04
Payer: MEDICARE

## 2024-03-04 VITALS
OXYGEN SATURATION: 96 % | SYSTOLIC BLOOD PRESSURE: 134 MMHG | BODY MASS INDEX: 42.48 KG/M2 | WEIGHT: 286.81 LBS | DIASTOLIC BLOOD PRESSURE: 80 MMHG | HEART RATE: 96 BPM | HEIGHT: 69 IN | RESPIRATION RATE: 12 BRPM

## 2024-03-04 DIAGNOSIS — F19.20 DRUG HABITUATION: ICD-10-CM

## 2024-03-04 DIAGNOSIS — M54.30 SCIATICA, UNSPECIFIED LATERALITY: Primary | ICD-10-CM

## 2024-03-04 DIAGNOSIS — E11.9 TYPE 2 DIABETES MELLITUS WITHOUT COMPLICATION, WITH LONG-TERM CURRENT USE OF INSULIN: ICD-10-CM

## 2024-03-04 DIAGNOSIS — Z79.4 TYPE 2 DIABETES MELLITUS WITHOUT COMPLICATION, WITH LONG-TERM CURRENT USE OF INSULIN: ICD-10-CM

## 2024-03-04 PROCEDURE — 80347 BENZODIAZEPINES 13 OR MORE: CPT | Performed by: STUDENT IN AN ORGANIZED HEALTH CARE EDUCATION/TRAINING PROGRAM

## 2024-03-04 PROCEDURE — 3066F NEPHROPATHY DOC TX: CPT | Mod: CPTII,S$GLB,, | Performed by: STUDENT IN AN ORGANIZED HEALTH CARE EDUCATION/TRAINING PROGRAM

## 2024-03-04 PROCEDURE — 1160F RVW MEDS BY RX/DR IN RCRD: CPT | Mod: CPTII,S$GLB,, | Performed by: STUDENT IN AN ORGANIZED HEALTH CARE EDUCATION/TRAINING PROGRAM

## 2024-03-04 PROCEDURE — 99999 PR PBB SHADOW E&M-EST. PATIENT-LVL V: CPT | Mod: PBBFAC,,, | Performed by: STUDENT IN AN ORGANIZED HEALTH CARE EDUCATION/TRAINING PROGRAM

## 2024-03-04 PROCEDURE — 3079F DIAST BP 80-89 MM HG: CPT | Mod: CPTII,S$GLB,, | Performed by: STUDENT IN AN ORGANIZED HEALTH CARE EDUCATION/TRAINING PROGRAM

## 2024-03-04 PROCEDURE — 80326 AMPHETAMINES 5 OR MORE: CPT | Performed by: STUDENT IN AN ORGANIZED HEALTH CARE EDUCATION/TRAINING PROGRAM

## 2024-03-04 PROCEDURE — 4010F ACE/ARB THERAPY RXD/TAKEN: CPT | Mod: CPTII,S$GLB,, | Performed by: STUDENT IN AN ORGANIZED HEALTH CARE EDUCATION/TRAINING PROGRAM

## 2024-03-04 PROCEDURE — 1159F MED LIST DOCD IN RCRD: CPT | Mod: CPTII,S$GLB,, | Performed by: STUDENT IN AN ORGANIZED HEALTH CARE EDUCATION/TRAINING PROGRAM

## 2024-03-04 PROCEDURE — 3075F SYST BP GE 130 - 139MM HG: CPT | Mod: CPTII,S$GLB,, | Performed by: STUDENT IN AN ORGANIZED HEALTH CARE EDUCATION/TRAINING PROGRAM

## 2024-03-04 PROCEDURE — 3008F BODY MASS INDEX DOCD: CPT | Mod: CPTII,S$GLB,, | Performed by: STUDENT IN AN ORGANIZED HEALTH CARE EDUCATION/TRAINING PROGRAM

## 2024-03-04 PROCEDURE — 3052F HG A1C>EQUAL 8.0%<EQUAL 9.0%: CPT | Mod: CPTII,S$GLB,, | Performed by: STUDENT IN AN ORGANIZED HEALTH CARE EDUCATION/TRAINING PROGRAM

## 2024-03-04 PROCEDURE — 3061F NEG MICROALBUMINURIA REV: CPT | Mod: CPTII,S$GLB,, | Performed by: STUDENT IN AN ORGANIZED HEALTH CARE EDUCATION/TRAINING PROGRAM

## 2024-03-04 PROCEDURE — 99214 OFFICE O/P EST MOD 30 MIN: CPT | Mod: S$GLB,,, | Performed by: STUDENT IN AN ORGANIZED HEALTH CARE EDUCATION/TRAINING PROGRAM

## 2024-03-04 RX ORDER — OMEPRAZOLE 20 MG/1
20 CAPSULE, DELAYED RELEASE ORAL DAILY
COMMUNITY
End: 2024-05-31 | Stop reason: SDUPTHER

## 2024-03-04 RX ORDER — DEUTETRABENAZINE 6-12-24 MG
6 KIT ORAL DAILY
COMMUNITY
Start: 2024-03-01

## 2024-03-04 RX ORDER — METOPROLOL SUCCINATE 50 MG/1
50 TABLET, EXTENDED RELEASE ORAL DAILY
COMMUNITY
Start: 2024-02-24 | End: 2024-05-31 | Stop reason: SDUPTHER

## 2024-03-04 RX ORDER — FUROSEMIDE 20 MG/1
20 TABLET ORAL DAILY
COMMUNITY
End: 2024-05-31

## 2024-03-04 RX ORDER — EMPAGLIFLOZIN 10 MG/1
10 TABLET, FILM COATED ORAL DAILY
COMMUNITY
Start: 2024-02-24 | End: 2024-03-04

## 2024-03-04 RX ORDER — DESVENLAFAXINE SUCCINATE 50 MG/1
50 TABLET, EXTENDED RELEASE ORAL DAILY
COMMUNITY
End: 2024-03-04

## 2024-03-04 RX ORDER — AMANTADINE HYDROCHLORIDE 100 MG/1
100 CAPSULE, GELATIN COATED ORAL 3 TIMES DAILY
COMMUNITY
End: 2024-05-31

## 2024-03-04 RX ORDER — ONDANSETRON 4 MG/1
4 TABLET, ORALLY DISINTEGRATING ORAL EVERY 8 HOURS PRN
COMMUNITY
End: 2024-04-08 | Stop reason: SDUPTHER

## 2024-03-04 RX ORDER — DESVENLAFAXINE 100 MG/1
1 TABLET, EXTENDED RELEASE ORAL DAILY
COMMUNITY
End: 2024-03-04

## 2024-03-04 RX ORDER — AMITRIPTYLINE HYDROCHLORIDE 150 MG/1
150 TABLET ORAL NIGHTLY
COMMUNITY

## 2024-03-04 RX ORDER — OXYBUTYNIN CHLORIDE 15 MG/1
15 TABLET, EXTENDED RELEASE ORAL DAILY
COMMUNITY
End: 2024-05-31 | Stop reason: SDUPTHER

## 2024-03-04 RX ORDER — ASPIRIN 81 MG/1
81 TABLET ORAL DAILY
COMMUNITY
Start: 2023-11-06

## 2024-03-04 RX ORDER — POTASSIUM CHLORIDE 600 MG/1
8 TABLET, FILM COATED, EXTENDED RELEASE ORAL DAILY
COMMUNITY
Start: 2023-11-06 | End: 2024-05-31

## 2024-03-04 RX ORDER — PRAVASTATIN SODIUM 40 MG/1
1 TABLET ORAL NIGHTLY
COMMUNITY
End: 2024-05-31 | Stop reason: SDUPTHER

## 2024-03-04 NOTE — PROGRESS NOTES
[facility] Note    Subjective:    The HPI and pertinent ROS is included in the Diagnostic Impression Remarks section at the end of the note. Please see below for further details. Chief complaint is at end of note.     Sommer is a pleasant intelligent patient who is here for evaluation.     Modified Medications    No medications on file       Data reviewed 274}  Previous medical records reviewed and summarized in plan section at end of note.      If you are due for any health screening(s) below please notify me so we can arrange them to be ordered and scheduled. Most healthy patients at your age complete them, but you are free to accept or refuse. If you can't do it, I'll definitely understand. If you can, I'd certainly appreciate it!     Tests to Keep You Healthy    Mammogram: SCHEDULED  Colon Cancer Screening: Met on 2022  Last Blood Pressure <= 139/89 (3/4/2024): Yes      The following portions of the patient's history were reviewed and updated as appropriate: allergies, current medications, past family history, past medical history, past social history, past surgical history and problem list.    She  has a past medical history of Arthritis, Diabetes, Gall bladder disease, Hypertension, Liver disease, Lung disease, Neuropathy, Psychiatric illness, and Reflux esophagitis.  She  has a past surgical history that includes Knee surgery () and  section ().    She  reports that she has been smoking. She does not have any smokeless tobacco history on file. She reports that she does not currently use alcohol. She reports that she does not currently use drugs.  She family history is not on file.    Review of patient's allergies indicates:   Allergen Reactions    Glucosamine Swelling     Reports allergy to seafood made her throat swell    Guaifenesin Swelling    Hyoscyamine sulfate Rash and Swelling    Sulfa (sulfonamide antibiotics) Swelling and Rash    Aspirin     Aspirin,buffd-calcium  "carb-mag Other (See Comments)     gi bleed    Bactrim [sulfamethoxazole-trimethoprim]     Chlorquinaldol     Clarithromycin Other (See Comments)     Causes yeast infection    Clozaril [clozapine]     Compazine [prochlorperazine edisylate]     Haldol [haloperidol lactate]     Imitrex [sumatriptan succinate]     Keflex [cephalexin]     Latex Other (See Comments)     Pt says "puss started oozing from my hands after wearing gloves."    Liraglutide Hives    Medrol [methylprednisolone]     Penicillins     Doxycycline Rash    Egg derived Diarrhea    Hydroxyzine Rash     Received PO hydroxyzine 3/11/23 w/ no issues    Levofloxacin Nausea And Vomiting and Rash    Morphine Itching and Nausea And Vomiting    Nitrofurantoin Rash       Tobacco Use: High Risk (3/4/2024)    Patient History     Smoking Tobacco Use: Every Day     Smokeless Tobacco Use: Unknown     Passive Exposure: Not on file     Physical Examination  General appearance: alert, cooperative, no distress  Neck: no thyromegaly, no neck stiffness  Lungs: clear to auscultation, no wheezes, rales or rhonchi, symmetric air entry  Heart: normal rate, regular rhythm, normal S1, S2, no murmurs, rubs, clicks or gallops  Abdomen: soft, nontender, nondistended, no rigidity, rebound, or guarding.   Back: no point tenderness over spine  Extremities: peripheral pulses normal, no unilateral leg swelling or calf tenderness   Neurological:alert, oriented, normal speech, no new focal findings or movement disorder noted from baseline    BP Readings from Last 3 Encounters:   03/04/24 134/80   03/02/24 109/81   02/15/24 116/80     Wt Readings from Last 3 Encounters:   03/04/24 130.1 kg (286 lb 12.8 oz)   03/02/24 128.8 kg (284 lb)   02/05/24 134.7 kg (297 lb)     /80 (BP Location: Right arm, Patient Position: Sitting, BP Method: Large (Manual))   Pulse 96   Resp 12   Ht 5' 9" (1.753 m)   Wt 130.1 kg (286 lb 12.8 oz)   SpO2 96%   BMI 42.35 kg/m²    274}  Laboratory: I have " "reviewed old labs below:    274}    Lab Results   Component Value Date    WBC 11.43 02/05/2024    HGB 13.2 02/05/2024    HCT 39.3 02/05/2024    MCV 86 02/05/2024     02/05/2024     02/05/2024    K 4.1 02/05/2024     02/05/2024    CALCIUM 9.2 02/05/2024    CO2 23 02/05/2024     (H) 02/05/2024    BUN 29 (H) 02/05/2024    CREATININE 0.9 02/05/2024    EGFRNORACEVR >60.0 02/05/2024    ANIONGAP 13 02/05/2024    PROT 6.8 02/05/2024    ALBUMIN 3.4 (L) 02/05/2024    BILITOT 0.3 02/05/2024    ALKPHOS 109 02/05/2024    ALT 16 02/05/2024    AST 12 02/05/2024    INR 0.99 04/05/2020    CHOL 158 02/20/2024    TRIG 187 (H) 02/20/2024    HDL 36 (L) 02/20/2024    LDLCALC 84.6 02/20/2024    TSH 1.023 02/20/2024    HGBA1C 8.2 (H) 02/20/2024      Lab reviewed by me: Particular labs of significance that I will monitor, workup, or treat to improve are mentioned below in diagnostic impression remarks.    Imaging/EKG: I have reviewed the pertinent results and my findings are noted in remarks.  274}    CC:   Chief Complaint   Patient presents with    Referral     Pain management referral        274}    Assessment/Plan  Sommer Guevara is a 53 y.o. female who presents to clinic with:  1. Sciatica, unspecified laterality    2. Type 2 diabetes mellitus without complication, with long-term current use of insulin    3. Drug habituation       274}  Diagnostic Impression Remarks + HPI     Documentation entered by me for this encounter may have been done in part using speech-recognition technology. Although I have made an effort to ensure accuracy, "sound like" errors may exist and should be interpreted in context.      Sciatica: presented to the ED d/t pain. Severe pain. Was sent home with [facility]. Will do UDS. Advised patient we do no do chronic pain management. Patient agreed to go to pain management. Will do limited refills of norco. No contract d/t patient going to pain management and physical therapy  DM: patient able " to lose weight with mounjaro. Will increase mounjaro    Additional workup planned: see labs ordered below.    See below for labs and meds ordered with associated diagnosis      1. Sciatica, unspecified laterality  - Ambulatory referral/consult to Pain Clinic; Future  - Ambulatory referral/consult to Physical/Occupational Therapy; Future    2. Type 2 diabetes mellitus without complication, with long-term current use of insulin  - empagliflozin (JARDIANCE) 25 mg tablet; Take 1 tablet (25 mg total) by mouth once daily.  Dispense: 90 tablet; Refill: 3    3. Drug habituation  - POCT Urine Drug Screen Pain Management; Future  - Pain Clinic Drug Screen; Future  - Pain Clinic Drug Screen      Ivelisse Garland MD   274}    If you are due for any health screening(s) below please notify me so we can arrange them to be ordered and scheduled. Most healthy patients at your age complete them, but you are free to accept or refuse.     If you can't do it, I'll definitely understand. If you can, I'd certainly appreciate it!   Tests to Keep You Healthy    Mammogram: SCHEDULED  Colon Cancer Screening: Met on 1/6/2022  Last Blood Pressure <= 139/89 (3/4/2024): Yes

## 2024-03-05 DIAGNOSIS — M54.31 SCIATICA OF RIGHT SIDE: ICD-10-CM

## 2024-03-05 RX ORDER — HYDROCODONE BITARTRATE AND ACETAMINOPHEN 5; 325 MG/1; MG/1
1 TABLET ORAL EVERY 8 HOURS PRN
Qty: 12 TABLET | Refills: 0 | Status: SHIPPED | OUTPATIENT
Start: 2024-03-05 | End: 2024-03-15 | Stop reason: SDUPTHER

## 2024-03-05 NOTE — TELEPHONE ENCOUNTER
No care due was identified.  [facility] Embedded Care Due Messages. Reference number: [id number].   3/05/2024 8:53:08 AM CST

## 2024-03-05 NOTE — TELEPHONE ENCOUNTER
----- Message from Xavier Byrd sent at 3/5/2024  8:46 AM CST -----  Contact: caregiver  Type:  RX Refill Request    Who Called:  Jocy/Caregiver  Refill or New Rx:  Refill  RX Name and Strength:  HYDROcodone-acetaminophen (NORCO) 5-325 mg per tablet  How is the patient currently taking it? (ex. 1XDay):  as directed  Is this a 30 day or 90 day RX:  as directed    Preferred Pharmacy with phone number:    [facility], [place] - [address] [address]  Phone: [phone] Fax: [phone]      Local or Mail Order:  Local   Ordering Provider:  Gill Leigh Call Back Number:  [phone]    Additional Information:  States pt need a refill.Please advise

## 2024-03-06 DIAGNOSIS — E11.9 TYPE 2 DIABETES MELLITUS WITHOUT COMPLICATION, UNSPECIFIED WHETHER LONG TERM INSULIN USE: ICD-10-CM

## 2024-03-08 LAB
6MAM UR QL: NOT DETECTED
7AMINOCLONAZEPAM UR QL: NOT DETECTED
A-OH ALPRAZ UR QL: NOT DETECTED
ALPHA-OH-MIDAZOLAM: NOT DETECTED
ALPRAZ UR QL: NOT DETECTED
AMPHET UR QL SCN: NOT DETECTED
ANNOTATION COMMENT IMP: NORMAL
BARBITURATES UR QL: NEGATIVE
BUPRENORPHINE UR QL: NOT DETECTED
BZE UR QL: NEGATIVE
CARBOXYTHC UR QL: NEGATIVE
CARISOPRODOL UR QL: NEGATIVE
CLONAZEPAM UR QL: NOT DETECTED
CODEINE UR QL: NOT DETECTED
CREAT UR-MCNC: 99.2 MG/DL (ref 20–400)
DIAZEPAM UR QL: NOT DETECTED
ETHYL GLUCURONIDE UR QL: NEGATIVE
FENTANYL UR QL: NOT DETECTED
GABAPENTIN: PRESENT
HYDROCODONE UR QL: PRESENT
HYDROMORPHONE UR QL: PRESENT
LORAZEPAM UR QL: NOT DETECTED
MDA UR QL: NOT DETECTED
MDEA UR QL: NOT DETECTED
MDMA UR QL: NOT DETECTED
ME-PHENIDATE UR QL: NOT DETECTED
METHADONE UR QL: NEGATIVE
METHAMPHET UR QL: NOT DETECTED
MIDAZOLAM UR QL SCN: NOT DETECTED
MORPHINE UR QL: NOT DETECTED
NALOXONE: NOT DETECTED
NORBUPRENORPHINE UR QL CFM: NOT DETECTED
NORDIAZEPAM UR QL: NOT DETECTED
NORFENTANYL UR QL: NOT DETECTED
NORHYDROCODONE UR QL CFM: PRESENT
NORMEPERIDINE UR QL CFM: NOT DETECTED
NOROXYCODONE UR QL CFM: NOT DETECTED
NOROXYMORPHONE UR QL SCN: NOT DETECTED
OXAZEPAM UR QL: NOT DETECTED
OXYCODONE UR QL: NOT DETECTED
OXYMORPHONE UR QL: NOT DETECTED
PATHOLOGY STUDY: NORMAL
PCP UR QL: NEGATIVE
PHENTERMINE UR QL: NOT DETECTED
PREGABALIN: NOT DETECTED
SERVICE CMNT-IMP: NORMAL
TAPENTADOL UR QL SCN: NOT DETECTED
TAPENTADOL UR QL SCN: NOT DETECTED
TEMAZEPAM UR QL: NOT DETECTED
TRAMADOL UR QL: NEGATIVE
ZOLPIDEM METABOLITE: NOT DETECTED
ZOLPIDEM UR QL: NOT DETECTED

## 2024-03-09 ENCOUNTER — HOSPITAL ENCOUNTER (EMERGENCY)
Facility: HOSPITAL | Age: 54
Discharge: HOME | End: 2024-03-09
Attending: EMERGENCY MEDICINE
Payer: MEDICARE

## 2024-03-09 VITALS
DIASTOLIC BLOOD PRESSURE: 86 MMHG | SYSTOLIC BLOOD PRESSURE: 137 MMHG | RESPIRATION RATE: 20 BRPM | HEART RATE: 90 BPM | OXYGEN SATURATION: 95 % | TEMPERATURE: 98 F | HEIGHT: 69 IN | WEIGHT: 286 LBS | BODY MASS INDEX: 42.36 KG/M2

## 2024-03-09 DIAGNOSIS — R05.9 COUGH: ICD-10-CM

## 2024-03-09 DIAGNOSIS — J06.9 VIRAL UPPER RESPIRATORY ILLNESS: Primary | ICD-10-CM

## 2024-03-09 PROCEDURE — 71046 X-RAY EXAM CHEST 2 VIEWS: CPT | Mod: TC

## 2024-03-09 PROCEDURE — 71046 X-RAY EXAM CHEST 2 VIEWS: CPT | Mod: 26,,, | Performed by: RADIOLOGY

## 2024-03-09 PROCEDURE — 99283 EMERGENCY DEPT VISIT LOW MDM: CPT | Mod: 25

## 2024-03-09 PROCEDURE — 25000003 PHARM REV CODE 250: Performed by: NURSE PRACTITIONER

## 2024-03-09 RX ORDER — LORATADINE 10 MG/1
10 TABLET ORAL DAILY PRN
Qty: 60 TABLET | Refills: 0 | Status: SHIPPED | OUTPATIENT
Start: 2024-03-09 | End: 2025-03-09

## 2024-03-09 RX ORDER — BENZONATATE 100 MG/1
100 CAPSULE ORAL 3 TIMES DAILY PRN
Status: DISCONTINUED | OUTPATIENT
Start: 2024-03-09 | End: 2024-03-09 | Stop reason: HOSPADM

## 2024-03-09 RX ORDER — BENZONATATE 200 MG/1
200 CAPSULE ORAL 3 TIMES DAILY PRN
Qty: 60 CAPSULE | Refills: 1 | Status: SHIPPED | OUTPATIENT
Start: 2024-03-09 | End: 2024-05-31

## 2024-03-09 RX ADMIN — BENZONATATE 100 MG: 100 CAPSULE ORAL at 01:03

## 2024-03-09 NOTE — DISCHARGE INSTRUCTIONS
Rest, increase fluids, lots of water and liquids.  Tylenol and or Motrin as needed if not contraindicated due to your multiple allergies.  Call office for recheck return as needed

## 2024-03-09 NOTE — ED PROVIDER NOTES
"Encounter Date: 3/9/2024       History     Chief Complaint   Patient presents with    Cough     Patient has history of COPD. Denies shortness of breath. Patient states, "I'm just coughing real bad. I want to make sure that no one at the group home can catch what I have." Productive cough with green phlegm.      POV to ED alone.  Patient complains of cough for 3 weeks.  Green sputum at times.  Denies fever or vomiting.  Denies chest pain or shortness of breath.  Daily tobacco use.  Concerns that she may have TB.  Denies associated symptoms.  No fever, no night sweats, no hemoptysis.  States in 2002 while at [facility] she had a positive TB test, negative chest x-ray.  But, she did go through the treatment for TB due to living in a mental health facility.  No other complaints.  Declines labs    The history is provided by the patient.     Review of patient's allergies indicates:   Allergen Reactions    Glucosamine Swelling     Reports allergy to seafood made her throat swell    Guaifenesin Swelling    Hyoscyamine sulfate Rash and Swelling    Sulfa (sulfonamide antibiotics) Swelling and Rash    Aspirin     Aspirin,buffd-calcium carb-mag Other (See Comments)     gi bleed    Bactrim [sulfamethoxazole-trimethoprim]     Chlorquinaldol     Clarithromycin Other (See Comments)     Causes yeast infection    Clozaril [clozapine]     Compazine [prochlorperazine edisylate]     Haldol [haloperidol lactate]     Imitrex [sumatriptan succinate]     Keflex [cephalexin]     Latex Other (See Comments)     Pt says "puss started oozing from my hands after wearing gloves."    Liraglutide Hives    Medrol [methylprednisolone]     Penicillins     Doxycycline Rash    Egg derived Diarrhea    Hydroxyzine Rash     Received PO hydroxyzine 3/11/23 w/ no issues    Levofloxacin Nausea And Vomiting and Rash    Morphine Itching and Nausea And Vomiting    Nitrofurantoin Rash     Past Medical History:   Diagnosis Date    Arthritis     " Diabetes     Gall bladder disease     Hypertension     Liver disease     Lung disease     Neuropathy     Psychiatric illness     Reflux esophagitis      Past Surgical History:   Procedure Laterality Date     SECTION      KNEE SURGERY  2014     History reviewed. No pertinent family history.  Social History     Tobacco Use    Smoking status: Every Day   Substance Use Topics    Alcohol use: Not Currently    Drug use: Not Currently     Review of Systems   Constitutional:  Negative for chills and fever.   Respiratory:  Positive for cough. Negative for shortness of breath.    Cardiovascular:  Negative for chest pain.   All other systems reviewed and are negative.      Physical Exam     Initial Vitals [24 1229]   BP Pulse Resp Temp SpO2   137/86 90 20 97.5 °F (36.4 °C) 95 %      MAP       --         Physical Exam    Nursing note and vitals reviewed.  Constitutional: She appears well-developed and well-nourished. No distress.   HENT:   Head: Normocephalic and atraumatic.   Mouth/Throat: Oropharynx is clear and moist.   Eyes: Pupils are equal, round, and reactive to light.   Neck:   Normal range of motion.  Cardiovascular:  Normal rate and regular rhythm.           Pulmonary/Chest: Breath sounds normal. No respiratory distress. She has no wheezes. She has no rhonchi. She has no rales.   Abdominal: Abdomen is soft. There is no abdominal tenderness.   Musculoskeletal:         General: Normal range of motion.      Cervical back: Normal range of motion.     Neurological: She is alert and oriented to person, place, and time. She has normal strength. GCS score is 15. GCS eye subscore is 4. GCS verbal subscore is 5. GCS motor subscore is 6.   Skin: Skin is warm and dry. Capillary refill takes less than 2 seconds.   Psychiatric: She has a normal mood and affect. Thought content normal.         ED Course   Procedures  Labs Reviewed - No data to display       Imaging Results              X-Ray Chest PA And Lateral  (Final result)  Result time 03/09/24 13:42:07      Final result by Crissy Pagan MD (03/09/24 13:42:07)                   Impression:      No acute cardiopulmonary disease      Electronically signed by: Crissy Pagan MD  Date:    03/09/2024  Time:    13:42               Narrative:    EXAMINATION:  XR CHEST PA AND LATERAL    CLINICAL HISTORY:  Cough, unspecified    TECHNIQUE:  PA and lateral views of the chest were performed.    COMPARISON:  02/05/2024    FINDINGS:  The heart is not enlarged.  The lungs are well expanded and clear.  The costophrenic sulci are sharp.  Adelfo catheter remains in place with the tip at the level of the SVC                                    X-Rays:   Independently Interpreted Readings:   Other Readings:  EXAMINATION:  XR CHEST PA AND LATERAL     CLINICAL HISTORY:  Cough, unspecified     TECHNIQUE:  PA and lateral views of the chest were performed.     COMPARISON:  02/05/2024     FINDINGS:  The heart is not enlarged.  The lungs are well expanded and clear.  The costophrenic sulci are sharp.  Adelfo catheter remains in place with the tip at the level of the SVC     Impression:     No acute cardiopulmonary disease      Medications   benzonatate capsule 100 mg (100 mg Oral Given 3/9/24 1356)     Medical Decision Making  Presents for evaluation of cough.  X-ray ordered.  Declines lab work.  No other complaints, disposition pending  Differentials include but not limited to viral illness, bacterial illness, otitis, sinusitis, bronchitis, pneumonia  Discharged home, diagnosis viral upper respiratory illness.  Chest x-ray negative.  Prescriptions for home use.  To continue her inhalers, COPD medications.  Multiple allergies.  To follow up with her clinic.  Return as needed.  She agrees with plan of care    Amount and/or Complexity of Data Reviewed  Radiology: ordered. Decision-making details documented in ED Course.    Risk  OTC drugs.  Prescription drug management.                                       Clinical Impression:  Final diagnoses:  [R05.9] Cough  [J06.9] Viral upper respiratory illness (Primary)          ED Disposition Condition    Discharge Stable          ED Prescriptions       Medication Sig Dispense Start Date End Date Auth. Provider    benzonatate (TESSALON) 200 MG capsule Take 1 capsule (200 mg total) by mouth 3 (three) times daily as needed for Cough. 60 capsule 3/9/2024 -- Mahogany Zimmer NP    loratadine (CLARITIN) 10 mg tablet Take 1 tablet (10 mg total) by mouth daily as needed for Allergies. 60 tablet 3/9/2024 3/9/2025 Mahogany Zimmer NP          Follow-up Information       Follow up With Specialties Details Why Contact Info    Ivelisse Garland MD Family Medicine Call in 3 days  [address] [place] [address] [phone]               Mahogany Zimmer NP  03/09/24 2478       Mahogany Zimmer NP  03/09/24 6366

## 2024-03-11 DIAGNOSIS — E11.65 TYPE 2 DIABETES MELLITUS WITH HYPERGLYCEMIA, WITH LONG-TERM CURRENT USE OF INSULIN: ICD-10-CM

## 2024-03-11 DIAGNOSIS — Z79.4 TYPE 2 DIABETES MELLITUS WITH HYPERGLYCEMIA, WITH LONG-TERM CURRENT USE OF INSULIN: ICD-10-CM

## 2024-03-11 RX ORDER — TIRZEPATIDE 2.5 MG/.5ML
2.5 INJECTION, SOLUTION SUBCUTANEOUS
Qty: 4 PEN | Refills: 0 | Status: CANCELLED | OUTPATIENT
Start: 2024-03-11

## 2024-03-14 ENCOUNTER — PATIENT OUTREACH (OUTPATIENT)
Dept: ADMINISTRATIVE | Facility: HOSPITAL | Age: 54
End: 2024-03-14
Payer: MEDICARE

## 2024-03-14 NOTE — PROGRESS NOTES
Population Health Chart Review & Patient Outreach Details      Additional [facility] Health Notes:               Updates Requested / Reviewed:      Updated Care Coordination Note, Care Everywhere, and Immunizations Reconciliation Completed or Queried: [place]         Health Maintenance Topics Overdue:      [facility] Score: 1     Foot Exam                       Health Maintenance Topic(s) Outreach Outcomes & Actions Taken:    Eye Exam - Outreach Outcomes & Actions Taken  : Eye Camera Scheduled or Optometry/Ophthalmology Referral Placed/Appt Scheduled    Breast Cancer Screening - Outreach Outcomes & Actions Taken  : Mammogram Screening Scheduled    Diabetic Foot Exam - Outreach Outcomes & Actions Taken  : Noted on scheduled PCP appt note

## 2024-03-15 ENCOUNTER — OFFICE VISIT (OUTPATIENT)
Dept: FAMILY MEDICINE | Facility: CLINIC | Age: 54
End: 2024-03-15
Payer: MEDICARE

## 2024-03-15 VITALS
SYSTOLIC BLOOD PRESSURE: 108 MMHG | HEART RATE: 97 BPM | WEIGHT: 280.38 LBS | DIASTOLIC BLOOD PRESSURE: 72 MMHG | HEIGHT: 69 IN | BODY MASS INDEX: 41.53 KG/M2 | OXYGEN SATURATION: 97 % | RESPIRATION RATE: 18 BRPM

## 2024-03-15 DIAGNOSIS — I11.0 HYPERTENSIVE HEART DISEASE WITH HEART FAILURE: Primary | ICD-10-CM

## 2024-03-15 DIAGNOSIS — Z79.4 TYPE 2 DIABETES MELLITUS WITHOUT COMPLICATION, WITH LONG-TERM CURRENT USE OF INSULIN: ICD-10-CM

## 2024-03-15 DIAGNOSIS — M54.31 SCIATICA OF RIGHT SIDE: ICD-10-CM

## 2024-03-15 DIAGNOSIS — E11.9 TYPE 2 DIABETES MELLITUS WITHOUT COMPLICATION, WITH LONG-TERM CURRENT USE OF INSULIN: ICD-10-CM

## 2024-03-15 PROCEDURE — 3066F NEPHROPATHY DOC TX: CPT | Mod: CPTII,S$GLB,, | Performed by: STUDENT IN AN ORGANIZED HEALTH CARE EDUCATION/TRAINING PROGRAM

## 2024-03-15 PROCEDURE — 3008F BODY MASS INDEX DOCD: CPT | Mod: CPTII,S$GLB,, | Performed by: STUDENT IN AN ORGANIZED HEALTH CARE EDUCATION/TRAINING PROGRAM

## 2024-03-15 PROCEDURE — 3061F NEG MICROALBUMINURIA REV: CPT | Mod: CPTII,S$GLB,, | Performed by: STUDENT IN AN ORGANIZED HEALTH CARE EDUCATION/TRAINING PROGRAM

## 2024-03-15 PROCEDURE — 4010F ACE/ARB THERAPY RXD/TAKEN: CPT | Mod: CPTII,S$GLB,, | Performed by: STUDENT IN AN ORGANIZED HEALTH CARE EDUCATION/TRAINING PROGRAM

## 2024-03-15 PROCEDURE — 3078F DIAST BP <80 MM HG: CPT | Mod: CPTII,S$GLB,, | Performed by: STUDENT IN AN ORGANIZED HEALTH CARE EDUCATION/TRAINING PROGRAM

## 2024-03-15 PROCEDURE — 99999 PR PBB SHADOW E&M-EST. PATIENT-LVL III: CPT | Mod: PBBFAC,,, | Performed by: STUDENT IN AN ORGANIZED HEALTH CARE EDUCATION/TRAINING PROGRAM

## 2024-03-15 PROCEDURE — 1159F MED LIST DOCD IN RCRD: CPT | Mod: CPTII,S$GLB,, | Performed by: STUDENT IN AN ORGANIZED HEALTH CARE EDUCATION/TRAINING PROGRAM

## 2024-03-15 PROCEDURE — 1160F RVW MEDS BY RX/DR IN RCRD: CPT | Mod: CPTII,S$GLB,, | Performed by: STUDENT IN AN ORGANIZED HEALTH CARE EDUCATION/TRAINING PROGRAM

## 2024-03-15 PROCEDURE — 3074F SYST BP LT 130 MM HG: CPT | Mod: CPTII,S$GLB,, | Performed by: STUDENT IN AN ORGANIZED HEALTH CARE EDUCATION/TRAINING PROGRAM

## 2024-03-15 PROCEDURE — 3052F HG A1C>EQUAL 8.0%<EQUAL 9.0%: CPT | Mod: CPTII,S$GLB,, | Performed by: STUDENT IN AN ORGANIZED HEALTH CARE EDUCATION/TRAINING PROGRAM

## 2024-03-15 PROCEDURE — 99214 OFFICE O/P EST MOD 30 MIN: CPT | Mod: S$GLB,,, | Performed by: STUDENT IN AN ORGANIZED HEALTH CARE EDUCATION/TRAINING PROGRAM

## 2024-03-15 RX ORDER — HYDROCODONE BITARTRATE AND ACETAMINOPHEN 5; 325 MG/1; MG/1
1 TABLET ORAL EVERY 8 HOURS PRN
Qty: 12 TABLET | Refills: 0 | Status: SHIPPED | OUTPATIENT
Start: 2024-03-15 | End: 2024-05-31

## 2024-03-15 RX ORDER — METHOCARBAMOL 750 MG/1
750 TABLET, FILM COATED ORAL 4 TIMES DAILY
Qty: 40 TABLET | Refills: 3 | Status: SHIPPED | OUTPATIENT
Start: 2024-03-15 | End: 2024-04-24

## 2024-03-15 RX ORDER — DICLOFENAC SODIUM 10 MG/G
2 GEL TOPICAL 4 TIMES DAILY
Qty: 1 EACH | Refills: 3 | Status: SHIPPED | OUTPATIENT
Start: 2024-03-15 | End: 2024-05-31

## 2024-03-15 RX ORDER — TIRZEPATIDE 5 MG/.5ML
5 INJECTION, SOLUTION SUBCUTANEOUS
Qty: 4 PEN | Refills: 0 | Status: SHIPPED | OUTPATIENT
Start: 2024-03-15 | End: 2024-04-26

## 2024-03-15 NOTE — PROGRESS NOTES
[facility] Note    Subjective:    The HPI and pertinent ROS is included in the Diagnostic Impression Remarks section at the end of the note. Please see below for further details. Chief complaint is at end of note.     Sommer is a pleasant intelligent patient who is here for evaluation.     Modified Medications    Modified Medication Previous Medication    HYDROCODONE-ACETAMINOPHEN (NORCO) 5-325 MG PER TABLET HYDROcodone-acetaminophen (NORCO) 5-325 mg per tablet       Take 1 tablet by mouth every 8 (eight) hours as needed for Pain.    Take 1 tablet by mouth every 8 (eight) hours as needed for Pain.       Data reviewed 274}  Previous medical records reviewed and summarized in plan section at end of note.      If you are due for any health screening(s) below please notify me so we can arrange them to be ordered and scheduled. Most healthy patients at your age complete them, but you are free to accept or refuse. If you can't do it, I'll definitely understand. If you can, I'd certainly appreciate it!     Tests to Keep You Healthy    Mammogram: SCHEDULED  Eye Exam: SCHEDULED  Colon Cancer Screening: Met on 2022  Last Blood Pressure <= 139/89 (3/15/2024): Yes  Last HbA1c < 8 (2024): NO      The following portions of the patient's history were reviewed and updated as appropriate: allergies, current medications, past family history, past medical history, past social history, past surgical history and problem list.    She  has a past medical history of Arthritis, Diabetes, Gall bladder disease, Hypertension, Liver disease, Lung disease, Neuropathy, Psychiatric illness, and Reflux esophagitis.  She  has a past surgical history that includes Knee surgery () and  section ().    She  reports that she has been smoking. She does not have any smokeless tobacco history on file. She reports that she does not currently use alcohol. She reports that she does not currently use drugs.  She  "family history is not on file.    Review of patient's allergies indicates:   Allergen Reactions    Glucosamine Swelling     Reports allergy to seafood made her throat swell    Guaifenesin Swelling    Hyoscyamine sulfate Rash and Swelling    Sulfa (sulfonamide antibiotics) Swelling and Rash    Aspirin     Aspirin,buffd-calcium carb-mag Other (See Comments)     gi bleed    Bactrim [sulfamethoxazole-trimethoprim]     Chlorquinaldol     Clarithromycin Other (See Comments)     Causes yeast infection    Clozaril [clozapine]     Compazine [prochlorperazine edisylate]     Haldol [haloperidol lactate]     Imitrex [sumatriptan succinate]     Keflex [cephalexin]     Latex Other (See Comments)     Pt says "puss started oozing from my hands after wearing gloves."    Liraglutide Hives    Medrol [methylprednisolone]     Penicillins     Doxycycline Rash    Egg derived Diarrhea    Hydroxyzine Rash     Received PO hydroxyzine 3/11/23 w/ no issues    Levofloxacin Nausea And Vomiting and Rash    Morphine Itching and Nausea And Vomiting    Nitrofurantoin Rash       Tobacco Use: High Risk (3/15/2024)    Patient History     Smoking Tobacco Use: Every Day     Smokeless Tobacco Use: Unknown     Passive Exposure: Not on file     Physical Examination  General appearance: alert, cooperative, no distress  Neck: no thyromegaly, no neck stiffness  Lungs: clear to auscultation, no wheezes, rales or rhonchi, symmetric air entry  Heart: normal rate, regular rhythm, normal S1, S2, no murmurs, rubs, clicks or gallops  Abdomen: soft, nontender, nondistended, no rigidity, rebound, or guarding.   Back: no point tenderness over spine  Extremities: peripheral pulses normal, no unilateral leg swelling or calf tenderness   Neurological:alert, oriented, normal speech, no new focal findings or movement disorder noted from baseline    BP Readings from Last 3 Encounters:   03/15/24 108/72   03/09/24 137/86   03/04/24 134/80     Wt Readings from Last 3 " "Encounters:   03/15/24 127.2 kg (280 lb 6.4 oz)   03/09/24 129.7 kg (286 lb)   03/04/24 130.1 kg (286 lb 12.8 oz)     /72 (BP Location: Left arm, Patient Position: Sitting, BP Method: Large (Manual))   Pulse 97   Resp 18   Ht 5' 9" (1.753 m)   Wt 127.2 kg (280 lb 6.4 oz)   SpO2 97%   BMI 41.41 kg/m²    274}  Laboratory: I have reviewed old labs below:    274}    Lab Results   Component Value Date    WBC 11.43 02/05/2024    HGB 13.2 02/05/2024    HCT 39.3 02/05/2024    MCV 86 02/05/2024     02/05/2024     02/05/2024    K 4.1 02/05/2024     02/05/2024    CALCIUM 9.2 02/05/2024    CO2 23 02/05/2024     (H) 02/05/2024    BUN 29 (H) 02/05/2024    CREATININE 0.9 02/05/2024    EGFRNORACEVR >60.0 02/05/2024    ANIONGAP 13 02/05/2024    PROT 6.8 02/05/2024    ALBUMIN 3.4 (L) 02/05/2024    BILITOT 0.3 02/05/2024    ALKPHOS 109 02/05/2024    ALT 16 02/05/2024    AST 12 02/05/2024    INR 0.99 04/05/2020    CHOL 158 02/20/2024    TRIG 187 (H) 02/20/2024    HDL 36 (L) 02/20/2024    LDLCALC 84.6 02/20/2024    TSH 1.023 02/20/2024    HGBA1C 8.2 (H) 02/20/2024      Lab reviewed by me: Particular labs of significance that I will monitor, workup, or treat to improve are mentioned below in diagnostic impression remarks.    Imaging/EKG: I have reviewed the pertinent results and my findings are noted in remarks.  274}    CC:   Chief Complaint   Patient presents with    Medication Refill     Refills on robaxin, monjuaro, and norco    Pain mgmt referral to Dr. Landa     Follow-up        274}    Assessment/Plan  Sommer Guevara is a 53 y.o. female who presents to clinic with:  1. Hypertensive heart disease with heart failure    2. Type 2 diabetes mellitus without complication, with long-term current use of insulin    3. Sciatica of right side       274}  Diagnostic Impression Remarks + HPI     Documentation entered by me for this encounter may have been done in part using speech-recognition technology. " "Although I have made an effort to ensure accuracy, "sound like" errors may exist and should be interpreted in context.      Sciatica: medication is help w/ patient. Will refill. Advise exercising. Informed patient that we do not write chronic pain medications and this is the last time we will write her norco and she needs to get into see pain management for further refills. Patient unable to afford to go to PT  DM: losing weight w/ mounjaro. Will increase   HTN: anticipate improving w/ losing weight    Additional workup planned: see labs ordered below.    See below for labs and meds ordered with associated diagnosis      1. Hypertensive heart disease with heart failure    2. Type 2 diabetes mellitus without complication, with long-term current use of insulin  - tirzepatide (MOUNJARO) 5 mg/0.5 mL PnIj; Inject 5 mg into the skin every 7 days.  Dispense: 4 Pen; Refill: 0    3. Sciatica of right side  - methocarbamoL (ROBAXIN) 750 MG Tab; Take 1 tablet (750 mg total) by mouth 4 (four) times daily.  Dispense: 40 tablet; Refill: 3  - HYDROcodone-acetaminophen (NORCO) 5-325 mg per tablet; Take 1 tablet by mouth every 8 (eight) hours as needed for Pain.  Dispense: 12 tablet; Refill: 0  - diclofenac sodium (VOLTAREN) 1 % Gel; Apply 2 g topically 4 (four) times daily.  Dispense: 1 each; Refill: 3      Ivelisse Garland MD   274}    If you are due for any health screening(s) below please notify me so we can arrange them to be ordered and scheduled. Most healthy patients at your age complete them, but you are free to accept or refuse.     If you can't do it, I'll definitely understand. If you can, I'd certainly appreciate it!   Tests to Keep You Healthy    Mammogram: SCHEDULED  Eye Exam: SCHEDULED  Colon Cancer Screening: Met on 1/6/2022  Last Blood Pressure <= 139/89 (3/15/2024): Yes  Last HbA1c < 8 (02/20/2024): NO        "

## 2024-04-08 ENCOUNTER — OFFICE VISIT (OUTPATIENT)
Dept: PODIATRY | Facility: CLINIC | Age: 54
End: 2024-04-08
Payer: MEDICARE

## 2024-04-08 VITALS
HEIGHT: 69 IN | DIASTOLIC BLOOD PRESSURE: 67 MMHG | WEIGHT: 270 LBS | SYSTOLIC BLOOD PRESSURE: 92 MMHG | RESPIRATION RATE: 18 BRPM | BODY MASS INDEX: 39.99 KG/M2 | HEART RATE: 94 BPM

## 2024-04-08 DIAGNOSIS — E11.9 COMPREHENSIVE DIABETIC FOOT EXAMINATION, TYPE 2 DM, ENCOUNTER FOR: Primary | ICD-10-CM

## 2024-04-08 DIAGNOSIS — E11.42 TYPE 2 DIABETES MELLITUS WITH PERIPHERAL NEUROPATHY: ICD-10-CM

## 2024-04-08 DIAGNOSIS — B35.1 ONYCHOMYCOSIS OF TOENAIL: ICD-10-CM

## 2024-04-08 DIAGNOSIS — L84 FOOT CALLUS: ICD-10-CM

## 2024-04-08 DIAGNOSIS — M79.674 PAIN OF TOES OF BOTH FEET: ICD-10-CM

## 2024-04-08 DIAGNOSIS — M79.675 PAIN OF TOES OF BOTH FEET: ICD-10-CM

## 2024-04-08 DIAGNOSIS — E11.65 TYPE 2 DIABETES MELLITUS WITH HYPERGLYCEMIA, UNSPECIFIED WHETHER LONG TERM INSULIN USE: ICD-10-CM

## 2024-04-08 PROCEDURE — 3078F DIAST BP <80 MM HG: CPT | Mod: CPTII,S$GLB,, | Performed by: PODIATRIST

## 2024-04-08 PROCEDURE — 3066F NEPHROPATHY DOC TX: CPT | Mod: CPTII,S$GLB,, | Performed by: PODIATRIST

## 2024-04-08 PROCEDURE — 3074F SYST BP LT 130 MM HG: CPT | Mod: CPTII,S$GLB,, | Performed by: PODIATRIST

## 2024-04-08 PROCEDURE — 3061F NEG MICROALBUMINURIA REV: CPT | Mod: CPTII,S$GLB,, | Performed by: PODIATRIST

## 2024-04-08 PROCEDURE — 4010F ACE/ARB THERAPY RXD/TAKEN: CPT | Mod: CPTII,S$GLB,, | Performed by: PODIATRIST

## 2024-04-08 PROCEDURE — 99999 PR PBB SHADOW E&M-EST. PATIENT-LVL V: CPT | Mod: PBBFAC,,, | Performed by: PODIATRIST

## 2024-04-08 PROCEDURE — 3008F BODY MASS INDEX DOCD: CPT | Mod: CPTII,S$GLB,, | Performed by: PODIATRIST

## 2024-04-08 PROCEDURE — 1159F MED LIST DOCD IN RCRD: CPT | Mod: CPTII,S$GLB,, | Performed by: PODIATRIST

## 2024-04-08 PROCEDURE — 3052F HG A1C>EQUAL 8.0%<EQUAL 9.0%: CPT | Mod: CPTII,S$GLB,, | Performed by: PODIATRIST

## 2024-04-08 PROCEDURE — 99203 OFFICE O/P NEW LOW 30 MIN: CPT | Mod: S$GLB,,, | Performed by: PODIATRIST

## 2024-04-08 RX ORDER — QUETIAPINE 400 MG/1
400 TABLET, FILM COATED, EXTENDED RELEASE ORAL NIGHTLY
COMMUNITY
Start: 2024-03-23

## 2024-04-08 RX ORDER — GABAPENTIN 400 MG/1
400 CAPSULE ORAL 3 TIMES DAILY
COMMUNITY
Start: 2024-03-26 | End: 2024-04-08 | Stop reason: ALTCHOICE

## 2024-04-08 RX ORDER — AMITRIPTYLINE HYDROCHLORIDE 100 MG/1
100 TABLET ORAL NIGHTLY
COMMUNITY
Start: 2024-04-06 | End: 2024-05-31 | Stop reason: SDUPTHER

## 2024-04-08 RX ORDER — VENLAFAXINE HYDROCHLORIDE 150 MG/1
150 CAPSULE, EXTENDED RELEASE ORAL
COMMUNITY
Start: 2024-03-23

## 2024-04-08 RX ORDER — ONDANSETRON 4 MG/1
4 TABLET, FILM COATED ORAL EVERY 8 HOURS
COMMUNITY
Start: 2024-04-02 | End: 2024-05-31

## 2024-04-08 RX ORDER — DOCUSATE SODIUM 100 MG/1
100 CAPSULE, LIQUID FILLED ORAL
COMMUNITY
Start: 2024-03-23

## 2024-04-08 NOTE — PROGRESS NOTES
"Subjective:       Patient ID: Sommer Guevara is a 53 y.o. female.    Chief Complaint: Diabetic Foot Exam, Nail Problem, Toe Pain, and Callouses  Patient presents for annual diabetic foot exam, to establish care due to a 15 year history of diabetes.  Patient relates diabetes is now better controlled.  Relates previously she was in the nursing home for "flesh eating bacteria" and is now staying with a friend who cooks healthy, she is taking her medications as instructed and states she is already lost 35 lb.  She presents with caregiver today with complaint of pain 2nd digits bilateral and 3rd digit right due to problems with the toenails.  She denies history of foot sores or infections.  Denies burning or tingling in feet  Does have necrotizing fasciitis diagnosed in medical record    Past Medical History:   Diagnosis Date    Arthritis     Diabetes     Gall bladder disease     Hypertension     Liver disease     Lung disease     Neuropathy     Psychiatric illness     Reflux esophagitis      Past Surgical History:   Procedure Laterality Date     SECTION      KNEE SURGERY       History reviewed. No pertinent family history.  Social History     Socioeconomic History    Marital status: Single   Tobacco Use    Smoking status: Every Day   Substance and Sexual Activity    Alcohol use: Not Currently    Drug use: Not Currently    Sexual activity: Not Currently       Current Outpatient Medications   Medication Sig Dispense Refill    albuterol-ipratropium (DUO-NEB) 2.5 mg-0.5 mg/3 mL nebulizer solution Take 3 mLs by nebulization every 6 (six) hours as needed for Wheezing. Rescue 75 mL 0    amantadine HCL (SYMMETREL) 100 mg capsule Take 100 mg by mouth 3 (three) times daily.      amitriptyline (ELAVIL) 100 MG tablet Take 100 mg by mouth every evening.      amitriptyline (ELAVIL) 150 MG Tab Take 150 mg by mouth every evening.      aspirin (ECOTRIN) 81 MG EC tablet Take 81 mg by mouth once " daily.      AUSTEDO XR TITRATION KT,WK1-4, 6 mg (14)-12 mg (14)-24 mg (14) T24d Take 6 mg by mouth once daily.      benzonatate (TESSALON) 200 MG capsule Take 1 capsule (200 mg total) by mouth 3 (three) times daily as needed for Cough. 60 capsule 1    blood sugar diagnostic Strp To check BG 2 times daily, to use with insurance preferred meter 100 each 11    blood-glucose meter kit To check BG 2 times daily, to use with insurance preferred meter 1 each 0    budesonide-glycopyr-formoterol (BREZTRI AEROSPHERE) 160-9-4.8 mcg/actuation HFAA Inhale 2 puffs into the lungs 2 (two) times daily. 8.5 g 3    carbamazepine (TEGRETOL XR) 100 MG 12 hr tablet Take 100 mg by mouth 2 (two) times daily.      diclofenac sodium (VOLTAREN) 1 % Gel Apply 2 g topically 4 (four) times daily. 1 each 3    docusate sodium (COLACE) 100 MG capsule Take 100 mg by mouth.      empagliflozin (JARDIANCE) 25 mg tablet Take 1 tablet (25 mg total) by mouth once daily. 90 tablet 3    fluticasone propionate (FLONASE) 50 mcg/actuation nasal spray 1 spray (50 mcg total) by Each Nostril route once daily. 18.4 mL 0    furosemide (LASIX) 20 MG tablet Take 20 mg by mouth once daily.      gabapentin (NEURONTIN) 600 MG tablet Take 1 tablet (600 mg total) by mouth 3 (three) times daily. 90 tablet 11    HYDROcodone-acetaminophen (NORCO) 5-325 mg per tablet Take 1 tablet by mouth every 8 (eight) hours as needed for Pain. 12 tablet 0    insulin glargine (LANTUS U-100 INSULIN) 100 unit/mL injection Inject 10 Units into the skin once daily. 3 mL 11    lancets Misc To check BG 2 times daily, to use with insurance preferred meter 100 each 11    lisinopriL 10 MG tablet Take 1 tablet (10 mg total) by mouth once daily. 90 tablet 3    loratadine (CLARITIN) 10 mg tablet Take 1 tablet (10 mg total) by mouth daily as needed for Allergies. 60 tablet 0    methocarbamoL (ROBAXIN) 750 MG Tab Take 1 tablet (750 mg total) by mouth 4 (four) times daily. 40 tablet 3  "   metoprolol succinate (TOPROL-XL) 50 MG 24 hr tablet Take 50 mg by mouth once daily.      nebulizer and compressor Alena 1 each by Misc.(Non-Drug; Combo Route) route 2 (two) times a day. 1 each 0    omeprazole (PRILOSEC) 20 MG capsule Take 20 mg by mouth once daily.      ondansetron (ZOFRAN) 4 MG tablet Take 4 mg by mouth every 8 (eight) hours.      oxybutynin (DITROPAN XL) 15 MG TR24 Take 15 mg by mouth once daily.      potassium chloride (KLOR-CON) 8 MEQ TbSR Take 8 mEq by mouth once daily.      pravastatin (PRAVACHOL) 40 MG tablet Take 1 tablet by mouth every evening.      QUEtiapine (SEROQUEL XR) 400 MG Tb24 Take 400 mg by mouth every evening.      quetiapine (SEROQUEL) 200 MG Tab Take by mouth.      tirzepatide (MOUNJARO) 5 mg/0.5 mL PnIj Inject 5 mg into the skin every 7 days. 4 Pen 0    venlafaxine (EFFEXOR-XR) 150 MG Cp24 Take 150 mg by mouth.       No current facility-administered medications for this visit.     Review of patient's allergies indicates:   Allergen Reactions    Glucosamine Swelling     Reports allergy to seafood made her throat swell    Guaifenesin Swelling    Hyoscyamine sulfate Rash and Swelling    Sulfa (sulfonamide antibiotics) Swelling and Rash    Aspirin     Aspirin,buffd-calcium carb-mag Other (See Comments)     gi bleed    Bactrim [sulfamethoxazole-trimethoprim]     Chlorquinaldol     Clarithromycin Other (See Comments)     Causes yeast infection    Clozaril [clozapine]     Compazine [prochlorperazine edisylate]     Haldol [haloperidol lactate]     Imitrex [sumatriptan succinate]     Keflex [cephalexin]     Latex Other (See Comments)     Pt says "puss started oozing from my hands after wearing gloves."    Liraglutide Hives    Medrol [methylprednisolone]     Penicillins     Doxycycline Rash    Egg derived Diarrhea    Hydroxyzine Rash     Received PO hydroxyzine 3/11/23 w/ no issues    Levofloxacin Nausea And Vomiting and Rash    Morphine Itching and " "Nausea And Vomiting    Nitrofurantoin Rash       Review of Systems   Musculoskeletal:  Positive for gait problem.        Slightly antalgic   All other systems reviewed and are negative.      Objective:      Vitals:    04/08/24 1114   BP: 92/67   Pulse: 94   Resp: 18   Weight: 122.5 kg (270 lb)   Height: 5' 9" (1.753 m)     Physical Exam  Vitals and nursing note reviewed. Exam conducted with a chaperone present.   Cardiovascular:      Pulses:           Dorsalis pedis pulses are 2+ on the right side and 2+ on the left side.        Posterior tibial pulses are 2+ on the right side and 2+ on the left side.   Pulmonary:      Effort: Pulmonary effort is normal.   Musculoskeletal:         General: No swelling.   Feet:      Right foot:      Protective Sensation: 5 sites tested.  5 sites sensed.      Skin integrity: Callus (Callus medial IPJ bilateral hallux with some discoloration indicating bleeding under the skin in this area on the right foot without pain or skin opening) and dry skin present. No erythema (Very painful upon pressure of the nail plate 2nd bilateral and 3rd right with no evidence of infection.  Absent hallux nails).      Toenail Condition: Right toenails are abnormally thick. Fungal disease present.     Left foot:      Protective Sensation: 5 sites tested.  5 sites sensed.      Skin integrity: Callus and dry skin present. No erythema.      Toenail Condition: Left toenails are abnormally thick. Fungal disease present.  Skin:     Capillary Refill: Capillary refill takes 2 to 3 seconds.      Findings: No bruising or erythema.   Neurological:      General: No focal deficit present.      Mental Status: She is alert.      Comments: Responds to stimuli with testing sensation all areas bilateral feet                 Hemoglobin A1C        Component Ref Range & Units 1 mo ago 1 yr ago   Hemoglobin A1C 4.0 - 5.6 % 8.2 High  11.0 High       Estimated Avg Glucose 68 - 131 mg/dL 189 High  269            Assessment:     "   1. Comprehensive diabetic foot examination, type 2 DM, encounter for    2. Type 2 diabetes mellitus with peripheral neuropathy    3. Pain of toes of both feet    4. Type 2 diabetes mellitus with hyperglycemia, unspecified whether long term insulin use    5. Foot callus    6. Onychomycosis of toenail        Plan:           Comprehensive diabetic pedal exam performed  Sensation intact with testing today, discuss this with patient and advised this is a good sign especially due to the length of time she has been diabetic along with being uncontrolled for many years  We reviewed neuropathy with patient and caregiver  Advised pain of the toes most likely worse due to her neuropathy, even just with the condition of her toenails causing higher level of pain   Does take gabapentin, recently increased by PCP  Reviewed benefit of controled glucose/diabetes regarding potential foot problems especially neuropathy  Reviewed diabetic education  Advised patient and caregiver it is crucial she continues to work at controlling diabetes to try to prevent potential long term complications such as neuropathy, any problems with healing, other foot problems along with other medical problems including kidney and eyes disease  Advised patient just 35 lb weight loss is significant and helps control her diabetes and we discussed diet and exercise  Reviewed appropriate shoes,  especially indoors to protect feet  Discussed maintenance of skin, calluses debrided medial IPJ bilateral hallux with some bruising in this area on the right.  This was shown to caregiver and we discussed better supportive shoes and arch support  Reviewed pain due to thick neglected fungal nails and potential complications  Nails debrided in thickness reduced which offered patient relief, instructed to apply Vicks vapor rub twice daily, care for on a more regular basis, can go for pedicures  Low risk for potential complications  Reviewed need for daily foot checks and  instructed patient to contact the office with any area of redness or swelling which has not improved in a few days.  Patient/caregiver were  in understanding and agreement with treatment plan.  I counseled the patient on their conditions, implications and medical management.  Instructed patient/family to contact the office with any changes, questions, concerns, worsening of symptoms.   Total face to face time 30 minutes, exam, assessment, treatment, discussion, additional time for review of chart prior to and following appointment and visit documentation, consultation and coordination of care.    Follow up as needed    This note was created using M*Modal voice recognition software that occasionally misinterpreted phrases or words.

## 2024-04-26 DIAGNOSIS — Z79.4 TYPE 2 DIABETES MELLITUS WITHOUT COMPLICATION, WITH LONG-TERM CURRENT USE OF INSULIN: ICD-10-CM

## 2024-04-26 DIAGNOSIS — E11.9 TYPE 2 DIABETES MELLITUS WITHOUT COMPLICATION, WITH LONG-TERM CURRENT USE OF INSULIN: ICD-10-CM

## 2024-04-26 RX ORDER — TIRZEPATIDE 5 MG/.5ML
5 INJECTION, SOLUTION SUBCUTANEOUS
Qty: 4 PEN | Refills: 0 | Status: CANCELLED | OUTPATIENT
Start: 2024-04-26

## 2024-04-26 NOTE — TELEPHONE ENCOUNTER
No care due was identified.  [facility] Embedded Care Due Messages. Reference number: [id number].   4/26/2024 12:06:30 PM CDT

## 2024-04-26 NOTE — TELEPHONE ENCOUNTER
Refill Routing Note   Medication(s) are not appropriate for processing by [facility] for the following reason(s):      New or recently adjusted medication    [facility] action(s):  Defer Care Due:  None identified            Appointments  past 12m or future 3m with PCP    Date Provider   Last Visit   3/15/2024 Ivelisse Garland MD   Next Visit   6/17/2024 Ivelisse Garland MD   ED visits in past 90 days: 3        Note composed:12:19 PM 04/26/2024

## 2024-04-26 NOTE — TELEPHONE ENCOUNTER
No care due was identified.  [facility] Embedded Care Due Messages. Reference number: [id number].   4/26/2024 12:44:54 PM CDT

## 2024-04-26 NOTE — TELEPHONE ENCOUNTER
----- Message from Saima Cai sent at 4/26/2024 12:01 PM CDT -----  Regarding: Refill  Refill or New Rx:Refill     RX Name and Strength:tirzepatide (MOUNJARO) 5 mg/0.5 mL Andrea       Preferred Pharmacy with phone number:[facility] [address] [place] [address], [place] - [address]       Local or Mail Order:Local       Would the patient rather a call back or a response via [facility]? Call back     Best Call Back Number:[phone]    Additional Information: Pt sts meds was on back order and is did not get her meds. Thank you

## 2024-04-26 NOTE — TELEPHONE ENCOUNTER
Refill Decision Note   Sommer Paola  is requesting a refill authorization.  Brief Assessment and Rationale for Refill:  Quick Discontinue     Medication Therapy Plan:       Medication Reconciliation Completed: No   Comments:     No Care Gaps recommended.     Note composed:2:56 PM 04/26/2024

## 2024-04-26 NOTE — TELEPHONE ENCOUNTER
No care due was identified.  [facility] Embedded Care Due Messages. Reference number: [id number].   4/26/2024 4:57:33 PM CDT

## 2024-04-27 RX ORDER — TIRZEPATIDE 7.5 MG/.5ML
INJECTION, SOLUTION SUBCUTANEOUS
Refills: 0 | OUTPATIENT
Start: 2024-04-27

## 2024-04-27 NOTE — TELEPHONE ENCOUNTER
[facility] Note  Quick DC. Inappropriate Request   Refill request requires further review by MD: NO   Medication Therapy Plan: Pharmacy is requesting new script(s) for the following medications without required information, (sig/ frequency/qty/etc)     [facility] action(s):  Quick Discontinue      Duplicate Pended Encounter(s)/ Last Prescribed Details:    Pharmacies have been requesting medications for patients without required information, (sig, frequency, qty, etc.). In addition, requests are sent for medication(s) pt. are currently not taking, and medications patients have never taken.    We have spoken to the pharmacies about these request types and advised their teams previously that we are unable to assess these New Script requests and require all details for these requests. This is a known issue and has been reported.        Medication related problems are not assessed for [facility]DC.   Medication Reconciliation Completed? NO Were there pending details that required adjustment? NO     Automatic Epic Generated Protocol Data Below:   Requested Prescriptions   Pending Prescriptions Disp Refills    MOUNJARO 7.5 mg/0.5 mL PnIj [Pharmacy Med Name: MOUNJARO  7.5MG/0.5ML INJ PEN (4X0.5ML)]  0              Appointments      Date Provider   Last Visit   3/15/2024 Ivelisse Garland MD   Next Visit   6/17/2024 Ivelisse Garland MD        Note composed:1:24 PM 04/27/2024

## 2024-04-29 DIAGNOSIS — E11.9 TYPE 2 DIABETES MELLITUS WITHOUT COMPLICATION, WITH LONG-TERM CURRENT USE OF INSULIN: ICD-10-CM

## 2024-04-29 DIAGNOSIS — Z79.4 TYPE 2 DIABETES MELLITUS WITHOUT COMPLICATION, WITH LONG-TERM CURRENT USE OF INSULIN: ICD-10-CM

## 2024-04-29 RX ORDER — TIRZEPATIDE 7.5 MG/.5ML
7.5 INJECTION, SOLUTION SUBCUTANEOUS
Qty: 4 PEN | Refills: 0 | OUTPATIENT
Start: 2024-04-29

## 2024-04-29 NOTE — TELEPHONE ENCOUNTER
----- Message from Amanda Silverio sent at 4/29/2024 10:01 AM CDT -----  Contact: [facility]  Type:  Pharmacy Calling to Clarify an RX    Name of Caller:  Miguelina  [facility]  Prescription Name:  tirzepatide 7.5 mg/0.5 mL PnIj    What do they need to clarify?:  pharmacy needs a script sent to [facility] for it to be filled  Best Call Back Number:  [phone]

## 2024-04-29 NOTE — TELEPHONE ENCOUNTER
Refill Routing Note   Medication(s) are not appropriate for processing by [facility] for the following reason(s):        New or recently adjusted medication  Other: pharmacy change request from [facility] to [facility] action(s):  Defer               Appointments  past 12m or future 3m with PCP    Date Provider   Last Visit   3/15/2024 Ivelisse Garland MD   Next Visit   4/29/2024 Ivelisse Garland MD   ED visits in past 90 days: 3        Note composed:3:22 PM 04/29/2024

## 2024-04-29 NOTE — TELEPHONE ENCOUNTER
No care due was identified.  [facility] Embedded Care Due Messages. Reference number: [id number].   4/29/2024 9:56:57 AM CDT

## 2024-04-29 NOTE — TELEPHONE ENCOUNTER
No care due was identified.  [facility] Embedded Care Due Messages. Reference number: [id number].   4/29/2024 9:19:36 AM CDT

## 2024-04-29 NOTE — TELEPHONE ENCOUNTER
Refill Decision Note   Sommer Guevara  is requesting a refill authorization.  Brief Assessment and Rationale for Refill:  Quick Discontinue     Medication Therapy Plan:   Script pending provider's review in separate request       Comments:     Note composed:3:22 PM 04/29/2024

## 2024-05-04 ENCOUNTER — HOSPITAL ENCOUNTER (EMERGENCY)
Facility: HOSPITAL | Age: 54
Discharge: PSYCHIATRIC OR SUBSTANCE USE FACILITY | End: 2024-05-05
Attending: EMERGENCY MEDICINE
Payer: MEDICARE

## 2024-05-04 DIAGNOSIS — F32.A DEPRESSION WITH SUICIDAL IDEATION: Primary | ICD-10-CM

## 2024-05-04 DIAGNOSIS — I10 HYPERTENSION COMPLICATIONS: ICD-10-CM

## 2024-05-04 DIAGNOSIS — R45.851 DEPRESSION WITH SUICIDAL IDEATION: Primary | ICD-10-CM

## 2024-05-04 LAB
ALBUMIN SERPL BCP-MCNC: 3.4 G/DL (ref 3.5–5.2)
ALP SERPL-CCNC: 123 U/L (ref 55–135)
ALT SERPL W/O P-5'-P-CCNC: 19 U/L (ref 10–44)
AMPHET+METHAMPHET UR QL: NEGATIVE
ANION GAP SERPL CALC-SCNC: 12 MMOL/L (ref 8–16)
APAP SERPL-MCNC: <3 UG/ML (ref 10–20)
AST SERPL-CCNC: 12 U/L (ref 10–40)
BARBITURATES UR QL SCN>200 NG/ML: NEGATIVE
BASOPHILS # BLD AUTO: 0.06 K/UL (ref 0–0.2)
BASOPHILS NFR BLD: 0.6 % (ref 0–1.9)
BENZODIAZ UR QL SCN>200 NG/ML: NEGATIVE
BILIRUB SERPL-MCNC: 0.2 MG/DL (ref 0.1–1)
BILIRUB UR QL STRIP: NEGATIVE
BUN SERPL-MCNC: 16 MG/DL (ref 6–20)
BZE UR QL SCN: NEGATIVE
CALCIUM SERPL-MCNC: 9.5 MG/DL (ref 8.7–10.5)
CANNABINOIDS UR QL SCN: NEGATIVE
CHLORIDE SERPL-SCNC: 103 MMOL/L (ref 95–110)
CLARITY UR: CLEAR
CO2 SERPL-SCNC: 23 MMOL/L (ref 23–29)
COLOR UR: YELLOW
CREAT SERPL-MCNC: 1.1 MG/DL (ref 0.5–1.4)
CREAT UR-MCNC: 45.5 MG/DL (ref 15–325)
DIFFERENTIAL METHOD BLD: NORMAL
EOSINOPHIL # BLD AUTO: 0.3 K/UL (ref 0–0.5)
EOSINOPHIL NFR BLD: 2.9 % (ref 0–8)
ERYTHROCYTE [DISTWIDTH] IN BLOOD BY AUTOMATED COUNT: 13.4 % (ref 11.5–14.5)
EST. GFR  (NO RACE VARIABLE): >60 ML/MIN/1.73 M^2
ETHANOL SERPL-MCNC: <10 MG/DL (ref 0–10)
GLUCOSE SERPL-MCNC: 369 MG/DL (ref 70–110)
GLUCOSE UR QL STRIP: ABNORMAL
HCT VFR BLD AUTO: 43.3 % (ref 37–48.5)
HGB BLD-MCNC: 14.6 G/DL (ref 12–16)
HGB UR QL STRIP: NEGATIVE
IMM GRANULOCYTES # BLD AUTO: 0.03 K/UL (ref 0–0.04)
IMM GRANULOCYTES NFR BLD AUTO: 0.3 % (ref 0–0.5)
KETONES UR QL STRIP: NEGATIVE
LEUKOCYTE ESTERASE UR QL STRIP: NEGATIVE
LYMPHOCYTES # BLD AUTO: 3.5 K/UL (ref 1–4.8)
LYMPHOCYTES NFR BLD: 32.8 % (ref 18–48)
MCH RBC QN AUTO: 29.6 PG (ref 27–31)
MCHC RBC AUTO-ENTMCNC: 33.7 G/DL (ref 32–36)
MCV RBC AUTO: 88 FL (ref 82–98)
METHADONE UR QL SCN>300 NG/ML: NEGATIVE
MONOCYTES # BLD AUTO: 0.9 K/UL (ref 0.3–1)
MONOCYTES NFR BLD: 8 % (ref 4–15)
NEUTROPHILS # BLD AUTO: 6 K/UL (ref 1.8–7.7)
NEUTROPHILS NFR BLD: 55.4 % (ref 38–73)
NITRITE UR QL STRIP: NEGATIVE
NRBC BLD-RTO: 0 /100 WBC
OPIATES UR QL SCN: NEGATIVE
PCP UR QL SCN>25 NG/ML: NEGATIVE
PH UR STRIP: 6 [PH] (ref 5–8)
PLATELET # BLD AUTO: 257 K/UL (ref 150–450)
PMV BLD AUTO: 11.2 FL (ref 9.2–12.9)
POTASSIUM SERPL-SCNC: 4 MMOL/L (ref 3.5–5.1)
PROT SERPL-MCNC: 6.5 G/DL (ref 6–8.4)
PROT UR QL STRIP: NEGATIVE
RBC # BLD AUTO: 4.94 M/UL (ref 4–5.4)
SALICYLATES SERPL-MCNC: <5 MG/DL (ref 15–30)
SARS-COV-2 RDRP RESP QL NAA+PROBE: NEGATIVE
SODIUM SERPL-SCNC: 138 MMOL/L (ref 136–145)
SP GR UR STRIP: 1.01 (ref 1–1.03)
TOXICOLOGY INFORMATION: NORMAL
URN SPEC COLLECT METH UR: ABNORMAL
UROBILINOGEN UR STRIP-ACNC: NEGATIVE EU/DL
WBC # BLD AUTO: 10.73 K/UL (ref 3.9–12.7)

## 2024-05-04 PROCEDURE — 80307 DRUG TEST PRSMV CHEM ANLYZR: CPT | Performed by: EMERGENCY MEDICINE

## 2024-05-04 PROCEDURE — 80143 DRUG ASSAY ACETAMINOPHEN: CPT | Performed by: EMERGENCY MEDICINE

## 2024-05-04 PROCEDURE — 80179 DRUG ASSAY SALICYLATE: CPT | Performed by: EMERGENCY MEDICINE

## 2024-05-04 PROCEDURE — 81003 URINALYSIS AUTO W/O SCOPE: CPT | Performed by: EMERGENCY MEDICINE

## 2024-05-04 PROCEDURE — 80053 COMPREHEN METABOLIC PANEL: CPT | Performed by: EMERGENCY MEDICINE

## 2024-05-04 PROCEDURE — 99285 EMERGENCY DEPT VISIT HI MDM: CPT

## 2024-05-04 PROCEDURE — G0426 INPT/ED TELECONSULT50: HCPCS | Mod: 95,,, | Performed by: PSYCHIATRY & NEUROLOGY

## 2024-05-04 PROCEDURE — 85025 COMPLETE CBC W/AUTO DIFF WBC: CPT | Performed by: EMERGENCY MEDICINE

## 2024-05-04 PROCEDURE — U0002 COVID-19 LAB TEST NON-CDC: HCPCS | Performed by: EMERGENCY MEDICINE

## 2024-05-04 PROCEDURE — 82077 ASSAY SPEC XCP UR&BREATH IA: CPT | Performed by: EMERGENCY MEDICINE

## 2024-05-05 VITALS
TEMPERATURE: 98 F | OXYGEN SATURATION: 97 % | BODY MASS INDEX: 39.99 KG/M2 | SYSTOLIC BLOOD PRESSURE: 130 MMHG | DIASTOLIC BLOOD PRESSURE: 70 MMHG | RESPIRATION RATE: 20 BRPM | WEIGHT: 270 LBS | HEART RATE: 81 BPM | HEIGHT: 69 IN

## 2024-05-05 LAB — POCT GLUCOSE: 165 MG/DL (ref 70–110)

## 2024-05-05 PROCEDURE — 93005 ELECTROCARDIOGRAM TRACING: CPT

## 2024-05-05 PROCEDURE — 25000003 PHARM REV CODE 250: Performed by: STUDENT IN AN ORGANIZED HEALTH CARE EDUCATION/TRAINING PROGRAM

## 2024-05-05 PROCEDURE — 82962 GLUCOSE BLOOD TEST: CPT

## 2024-05-05 PROCEDURE — 93010 ELECTROCARDIOGRAM REPORT: CPT | Mod: ,,, | Performed by: INTERNAL MEDICINE

## 2024-05-05 PROCEDURE — 25000003 PHARM REV CODE 250: Performed by: EMERGENCY MEDICINE

## 2024-05-05 RX ORDER — GABAPENTIN 300 MG/1
600 CAPSULE ORAL 3 TIMES DAILY
Status: DISCONTINUED | OUTPATIENT
Start: 2024-05-05 | End: 2024-05-05 | Stop reason: HOSPADM

## 2024-05-05 RX ORDER — LISINOPRIL 10 MG/1
10 TABLET ORAL
Status: COMPLETED | OUTPATIENT
Start: 2024-05-05 | End: 2024-05-05

## 2024-05-05 RX ORDER — ACETAMINOPHEN 325 MG/1
650 TABLET ORAL
Status: COMPLETED | OUTPATIENT
Start: 2024-05-05 | End: 2024-05-05

## 2024-05-05 RX ORDER — METOPROLOL SUCCINATE 25 MG/1
50 TABLET, EXTENDED RELEASE ORAL DAILY
Status: DISCONTINUED | OUTPATIENT
Start: 2024-05-05 | End: 2024-05-05 | Stop reason: HOSPADM

## 2024-05-05 RX ORDER — HYDROCODONE BITARTRATE AND ACETAMINOPHEN 5; 325 MG/1; MG/1
1 TABLET ORAL
Status: COMPLETED | OUTPATIENT
Start: 2024-05-05 | End: 2024-05-05

## 2024-05-05 RX ADMIN — LISINOPRIL 10 MG: 10 TABLET ORAL at 08:05

## 2024-05-05 RX ADMIN — METOPROLOL SUCCINATE 50 MG: 25 TABLET, EXTENDED RELEASE ORAL at 08:05

## 2024-05-05 RX ADMIN — GABAPENTIN 600 MG: 300 CAPSULE ORAL at 08:05

## 2024-05-05 RX ADMIN — ACETAMINOPHEN 650 MG: 325 TABLET ORAL at 04:05

## 2024-05-05 RX ADMIN — HYDROCODONE BITARTRATE AND ACETAMINOPHEN 1 TABLET: 5; 325 TABLET ORAL at 08:05

## 2024-05-05 NOTE — ED NOTES
"Noted, pt's phone rings and she answers, I walk in room and pt reports " it's my sister" pt is calm, cooperative,     Q15min evaluation in progress, with constant visual observation in progress, see notes    "

## 2024-05-05 NOTE — ED NOTES
"Awake, alert, resting quietly, reports SI, when asked if she is suicidal, pt states " I just want to take all my medicine" further questions and pt reports she wants to take all her medicine " to kill myself" pt denies any attempts, denies HI/VH/AH, pt admits to previous admission to psych facility     Calm, cooperative, obese, resp even non labored, skin dry, warm, noted pt wearing depends, ROM intact to all extremities, reports pain to R hip, rated 9/10, in no acute distress, no facial grimacing, pt denies abuse at group home, denies being sexual/physical abuse ,pt states that staff yell at her at group home    Pt is informed of plan of care, acceptance to psych facility, awaiting EMS for transportation to facility, pt verbalized understanding of plan of care      Noted pt's cell phone on chair     "

## 2024-05-05 NOTE — ED PROVIDER NOTES
"Encounter Date: 2024       History     Chief Complaint   Patient presents with    Suicidal     Reports SI x 3 days      54-year-old female, here from a local group home, stating that she is suicidal.  She states that she is thinking about overdosing on her medications.  She states that she has a history of similar intentional overdose in the past.  She states that she does not know why she was feeling this way.  Nothing has occurred over the last several days to make her feel this way.  She does admit that she has been out of her Seroquel and Effexor for 2 weeks.      Review of patient's allergies indicates:   Allergen Reactions    Glucosamine Swelling     Reports allergy to seafood made her throat swell    Guaifenesin Swelling    Hyoscyamine sulfate Rash and Swelling    Sulfa (sulfonamide antibiotics) Swelling and Rash    Aspirin     Aspirin,buffd-calcium carb-mag Other (See Comments)     gi bleed    Bactrim [sulfamethoxazole-trimethoprim]     Chlorquinaldol     Clarithromycin Other (See Comments)     Causes yeast infection    Clozaril [clozapine]     Compazine [prochlorperazine edisylate]     Haldol [haloperidol lactate]     Imitrex [sumatriptan succinate]     Keflex [cephalexin]     Latex Other (See Comments)     Pt says "puss started oozing from my hands after wearing gloves."    Liraglutide Hives    Medrol [methylprednisolone]     Penicillins     Doxycycline Rash    Egg derived Diarrhea    Hydroxyzine Rash     Received PO hydroxyzine 3/11/23 w/ no issues    Levofloxacin Nausea And Vomiting and Rash    Morphine Itching and Nausea And Vomiting    Nitrofurantoin Rash     Past Medical History:   Diagnosis Date    Arthritis     Diabetes     Gall bladder disease     Hypertension     Liver disease     Lung disease     Neuropathy     Psychiatric illness     Reflux esophagitis      Past Surgical History:   Procedure Laterality Date     SECTION      KNEE SURGERY       No family history on " file.  Social History     Tobacco Use    Smoking status: Every Day   Substance Use Topics    Alcohol use: Not Currently    Drug use: Not Currently     Review of Systems   Constitutional: Negative.    HENT: Negative.     Eyes: Negative.    Gastrointestinal: Negative.    Endocrine: Negative.    Genitourinary: Negative.    Musculoskeletal: Negative.    Neurological: Negative.    Psychiatric/Behavioral:  Positive for dysphoric mood and suicidal ideas. Negative for hallucinations and self-injury.        Physical Exam     Initial Vitals [05/04/24 2000]   BP Pulse Resp Temp SpO2   (!) 144/71 107 18 98.5 °F (36.9 °C) 98 %      MAP       --         Physical Exam    Nursing note and vitals reviewed.  Constitutional: She appears well-developed and well-nourished. No distress.   HENT:   Head: Normocephalic and atraumatic.   Nose: Nose normal.   Mouth/Throat: Oropharynx is clear and moist. No oropharyngeal exudate.   Eyes: Conjunctivae and EOM are normal. Pupils are equal, round, and reactive to light. No scleral icterus.   Neck: Neck supple. No JVD present.   Normal range of motion.  Cardiovascular:  Normal rate, regular rhythm, normal heart sounds and intact distal pulses.           No murmur heard.  Pulmonary/Chest: Breath sounds normal. No stridor. No respiratory distress. She has no wheezes. She has no rhonchi. She has no rales.   Abdominal: Abdomen is soft. Bowel sounds are normal. She exhibits no distension. There is no abdominal tenderness.   Musculoskeletal:         General: No tenderness or edema. Normal range of motion.      Cervical back: Normal range of motion and neck supple.     Neurological: She is alert and oriented to person, place, and time. She has normal strength. No cranial nerve deficit or sensory deficit. GCS score is 15. GCS eye subscore is 4. GCS verbal subscore is 5. GCS motor subscore is 6.   Skin: Skin is warm and dry. Capillary refill takes less than 2 seconds. No rash noted. No erythema.    Psychiatric: She has a normal mood and affect. Her behavior is normal.         ED Course   Procedures  Labs Reviewed   COMPREHENSIVE METABOLIC PANEL - Abnormal; Notable for the following components:       Result Value    Glucose 369 (*)     Albumin 3.4 (*)     All other components within normal limits   SALICYLATE LEVEL - Abnormal; Notable for the following components:    Salicylate Lvl <5.0 (*)     All other components within normal limits   URINALYSIS, REFLEX TO URINE CULTURE - Abnormal; Notable for the following components:    Glucose, UA 2+ (*)     All other components within normal limits    Narrative:     Preferred Collection Type->Urine, Clean Catch  Specimen Source->Urine   ACETAMINOPHEN LEVEL - Abnormal; Notable for the following components:    Acetaminophen (Tylenol), Serum <3.0 (*)     All other components within normal limits   CBC W/ AUTO DIFFERENTIAL   ALCOHOL,MEDICAL (ETHANOL)   SARS-COV-2 RNA AMPLIFICATION, QUAL   DRUG SCREEN PANEL, URINE EMERGENCY    Narrative:     Preferred Collection Type->Urine, Clean Catch  Specimen Source->Urine     EKG Readings: (Independently Interpreted)   EKG personally reviewed by me shows normal sinus rhythm, left axis, septal infarct of undetermined age, 91 beats per minute, MS interval 150, .  No ST elevation or depression, no T-wave changes, no arrhythmia       Imaging Results    None          Medications - No data to display  Medical Decision Making  54-year-old female, history of psychiatric illness, history of suicide attempt in the past, states she is feeling suicidal now.  States she is thinking about overdosing on her medications.  Differential includes suicidal ideations, malingering, medication noncompliance, etc.    Patient tells me that she is depressed, and suicidal, and thinking about killing herself with an overdose of her medications.  Patient was seen by Psychiatry, who believes patient needs inpatient treatment.  Patient is voluntary and wishes  to go to a psychiatric facility.  She is medically cleared.  Attempts at placement had an appropriate facility should now be undertaken.        Amount and/or Complexity of Data Reviewed  Labs: ordered.    Risk  OTC drugs.                  Medically cleared for psychiatry placement: 5/5/2024 12:35 AM                   Clinical Impression:  Final diagnoses:  [F32.A, R45.851] Depression with suicidal ideation (Primary)          ED Disposition Condition    Transfer to Psych Facility Stable          ED Prescriptions    None       Follow-up Information    None          Michel Jacobsen MD  05/05/24 0034       Michel Jacobsen MD  05/05/24 0037       Mihcel Jacobsen MD  05/05/24 0603

## 2024-05-05 NOTE — ED NOTES
Pt placed in paper scrubs. Pt items consist of one black cell phone, one pair of blue jeans, one T-shirt, one pair of white socks that pt requested to keep on, and one pack of cigarette with a lighter.  All items except for pt cell phone was placed in pt belongings bag. Pt identification sticker applied to pt belongings bag and bag was placed in top locker by nurses station.

## 2024-05-05 NOTE — ED NOTES
Patient arrives to ER via private vehicle for c/o suicidal thoughts over last 3 days. Patient has hx of same with known mental illness. Currently residing in group home since last inpatient psychiatric admission and voices she has been out of her seroquel and effexor x 2 weeks. She reports owner of group home doesn't want her to get medications filled because she feels

## 2024-05-05 NOTE — CONSULTS
[facility]  Psychiatry  Telepsychiatry Consult Note    Please see previous notes:    Patient agreeable to consultation via telepsychiatry.    Tele-Consultation from Psychiatry started: 5/4/2024 at 1025pm    The chief complaint leading to psychiatric consultation is: si      This consultation was requested by , the Emergency Department attending physician.    The location of the consulting psychiatrist is   [place]      The patient location is  [facility] EMERGENCY DEPARTMENT   The patient arrived at the ED at: 5/4      Also present with the patient at the time of the consultation: n/a      Patient Identification:   Sommer Guevara is a 54 y.o. female.    Patient information was obtained from patient and past medical records.  Patient presented voluntarily to the Emergency Department see ER notes      Consults  Consult Start Time: 05/04/2024 22:25 CDT  Consult End Time: 05/04/2024 23:05 CDT        Subjective:     History of Present Illness:  No notes on file     Per ER note  5/4/24  53 yo who came [facility] on 5/4/24  No MD note yet available in Epic for my review    RN note  Pt lives in Group Home  Has been out of Seroquel, Effexor  c/o SI for 3 days                    ======================================================  Telepsychiatry Assessment  (5/4/24)  1025 to 1040pm  chart review  1040pm to 1050  camera problems    Pt tells me she is having SI  Wants to take her medications and not wake up    Not under care of psychiatrist at this time  Says that her [facility] does not let her go to a psychiatrist    Wants to be in inpatient psych       ROS  Depressed  Hopeless  AH   Actively suicidal with plan and intent          Home Medications > not in EPIC  Lisinopril  ? Jardiance  Gabapentin  Elavil  Lantus  Effexor    says she ran out few weeks ago  Seroquel   says she ran out few weeks ago        Allergies:   24 listed allergies  Clozaril, haldol, Compazine,  hydroxyzine  Latex  Eggs  Morphine  Nitrofurantoin, doxycycline, Keflex, levofloxacin, clarithromycin, Bactrim, sulfa  aspirin  Others                Psychiatric History:   Primary dx: borderline PD    Recurrent trips to ER for SI  Recently >>   6/18/23, 12/5/23, 12/23/23, 1/5/24, 1/16/24    Previous Psychiatric Hospitalizations:  yes  Most recent  Jan 2024 [facility] for SI  Pt says she then went to another program  but graduated    Previous Medication Trials: yes    Previous Suicide Attempts:  pt says yes  2000      OD of xanax  March 2022 > overdose     History of Violence:    2000   when on OD Of xanax    History of Depression: chronically unstable > borderline PD   History of Elisa: denies    History of Auditory/Visual Hallucination: hears voices  comes/goes    History of Delusions:  denies    Outpatient psychiatrist (current & past):   Pt says she is not allowed to see a doctor  Pt says she is not seeing a psychiatrist currently               Substance Abuse History:  Tobacco   smokes 1 ppd    Etoh  drinks 2 to 3x per week    1 glass    Illicits     Per EPIC   hx of opioid abuse    pt denies this    Meth   last use 2008      detox/rehab  2008   alcohol, meth    Legal History: Past charges/incarcerations:  pt denies    Family Psychiatric History: deferred          Medical hx  From review of past EPIC encounters / problem list  obesity  HTN  HLD  CAD  COPD  PNA  GAMALIEL  Thyroid nodule       DM2  GERD  GB  cataract  Arthritis  Shingles  PID  Fall with right hip pain   ER eval  1/16/24  ovarian/uterine Ca  neuropathy  neurogenic bladder      head trauma: denies  seizures: denies              Surgical Hx  From review of EPIC notes  T&A  appendix  sinus  / septoplasty  mouth  BSO   BTL   hysterectomy   Thyroid surgery  C section  Right Knee  Cataracts  Left breast bx  I&D groin abscess              labs/Diagnostics  5/4/24  CBC wnl  Glc 369  Alb 3.4 UA +glc  COVID neg  UDS neg  Tylenol, asp neg            Social  History:  Developmental/Childhood:    Education: GED some college  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, worked as  [employer]    Employment   disabled since 2001    Relationship status:  x 3  4 children    Housing status: living in Group Home > since Jan 2024    Mil hx: none  Access to gun:  no   Bahai:deferred  rec activities/hobbies: deferred                    Psychiatric Mental Status Exam:  Arousal:  alert  Sensorium/Orientation: oriented to person/place/situation  appearance: obese, lying back on bed  Behavior/Cooperation: wnl  Speech: non pressured, non slurred  Language: fluent  Mood: depressed  Affect: full range  Thought Process: l/l/gd  Thought Content: active SI with plan to OD, endorses intermittent AH  denies HI  Auditory hallucinations: yes  Visual hallucinations: no  Paranoia: no  Delusions: no  Suicidal ideation: yes  Homicidal ideation: no  Attention/Concentration: adequate  Memory:  not tested  Fund of Knowledge: not tested  Abstract reasoning: not tested  Insight:  moderate  Judgment: moderate          Past Medical History:   Past Medical History:   Diagnosis Date    Arthritis     Diabetes     Gall bladder disease     Hypertension     Liver disease     Lung disease     Neuropathy     Psychiatric illness     Reflux esophagitis       Laboratory Data:   Labs Reviewed   COMPREHENSIVE METABOLIC PANEL - Abnormal; Notable for the following components:       Result Value    Glucose 369 (*)     Albumin 3.4 (*)     All other components within normal limits   SALICYLATE LEVEL - Abnormal; Notable for the following components:    Salicylate Lvl <5.0 (*)     All other components within normal limits   URINALYSIS, REFLEX TO URINE CULTURE - Abnormal; Notable for the following components:    Glucose, UA 2+ (*)     All other components within normal limits    Narrative:     Preferred Collection Type->Urine, Clean Catch  Specimen Source->Urine   ACETAMINOPHEN LEVEL - Abnormal; Notable for the following  "components:    Acetaminophen (Tylenol), Serum <3.0 (*)     All other components within normal limits   CBC W/ AUTO DIFFERENTIAL   ALCOHOL,MEDICAL (ETHANOL)   SARS-COV-2 RNA AMPLIFICATION, QUAL   DRUG SCREEN PANEL, URINE EMERGENCY    Narrative:     Preferred Collection Type->Urine, Clean Catch  Specimen Source->Urine       Neurological History:  Seizures: No  Head trauma: No    Allergies:   Review of patient's allergies indicates:   Allergen Reactions    Glucosamine Swelling     Reports allergy to seafood made her throat swell    Guaifenesin Swelling    Hyoscyamine sulfate Rash and Swelling    Sulfa (sulfonamide antibiotics) Swelling and Rash    Aspirin     Aspirin,buffd-calcium carb-mag Other (See Comments)     gi bleed    Bactrim [sulfamethoxazole-trimethoprim]     Chlorquinaldol     Clarithromycin Other (See Comments)     Causes yeast infection    Clozaril [clozapine]     Compazine [prochlorperazine edisylate]     Haldol [haloperidol lactate]     Imitrex [sumatriptan succinate]     Keflex [cephalexin]     Latex Other (See Comments)     Pt says "puss started oozing from my hands after wearing gloves."    Liraglutide Hives    Medrol [methylprednisolone]     Penicillins     Doxycycline Rash    Egg derived Diarrhea    Hydroxyzine Rash     Received PO hydroxyzine 3/11/23 w/ no issues    Levofloxacin Nausea And Vomiting and Rash    Morphine Itching and Nausea And Vomiting    Nitrofurantoin Rash       Medications in ER: Medications - No data to display    Medications at home:     No new subjective & objective note has been filed under this hospital service since the last note was generated.      Assessment - Diagnosis - Goals:     53 yo female with extensive psychiatric history, most recently admitted psychiatrically to [facility] in Jan 2024 5/4/24  Telepsych #1  (radha)  Pt came to ER from Group Home  No MD note is available for my review in [facility] as this consult was called to me from [facility]  This " consultant spent 15 minutes reviewing EPIC encounters/labs/etc.  Pt was interviewed from 1050 to 1105pm  Pt is actively suicidal with plan to end life by overdose    Hx of SA, hx of inpt treatment, chronic unstable mood  Off psych meds recently, intermittent AH    Recommend [facility] and inpatient psych placement      Diagnosis/Impression:   Borderline PD  unspecified psychosis  Multiple medical problems as above            ====================  Rec:   1)legal  Recommend [facility] for active SI with plan and intent  1:1 sitter  Suicide precautions        2)diagnostics  TSH, FT4  due to hx of thyroidectomy and unknown home med list  B1, B12  due to hx of diabetes  Check levels:  Tegretol, VPA, lithium   as  outpatient med list is complete mystery  Baseline EKG    3)meds in ER  Gabapentin 100 mg tid    4)other  Reconcile outpatient med list    5)disposition  Inpatient psych transfer once medically cleared                              Time with patient: 15min      More than 50% of the time was spent counseling/coordinating care    Consulting clinician was informed of the encounter and consult note.    Consultation ended: 5/4/2024 at 1110pm    Kalyn Gan MD   Psychiatry  [facility]

## 2024-05-06 LAB
OHS QRS DURATION: 106 MS
OHS QTC CALCULATION: 469 MS

## 2024-05-09 ENCOUNTER — PATIENT OUTREACH (OUTPATIENT)
Dept: ADMINISTRATIVE | Facility: HOSPITAL | Age: 54
End: 2024-05-09
Payer: MEDICARE

## 2024-05-09 DIAGNOSIS — E11.9 DIABETES MELLITUS WITHOUT COMPLICATION: Primary | ICD-10-CM

## 2024-05-09 NOTE — PROGRESS NOTES
Population Health Chart Review & Patient Outreach Details      Additional [facility] Health Notes:               Updates Requested / Reviewed:      Removed  or Duplicate Orders         Health Maintenance Topics Overdue:      [facility] Score: 2     Eye Exam  Mammogram                       Health Maintenance Topic(s) Outreach Outcomes & Actions Taken:    Lab(s) - Outreach Outcomes & Actions Taken  : Overdue Lab(s) Ordered

## 2024-05-24 DIAGNOSIS — E11.59 HYPERTENSION ASSOCIATED WITH TYPE 2 DIABETES MELLITUS: ICD-10-CM

## 2024-05-24 DIAGNOSIS — Z79.4 TYPE 2 DIABETES MELLITUS WITHOUT COMPLICATION, WITH LONG-TERM CURRENT USE OF INSULIN: ICD-10-CM

## 2024-05-24 DIAGNOSIS — E11.9 TYPE 2 DIABETES MELLITUS WITHOUT COMPLICATION, WITH LONG-TERM CURRENT USE OF INSULIN: ICD-10-CM

## 2024-05-24 DIAGNOSIS — E11.65 TYPE 2 DIABETES MELLITUS WITH HYPERGLYCEMIA, WITH LONG-TERM CURRENT USE OF INSULIN: ICD-10-CM

## 2024-05-24 DIAGNOSIS — I15.2 HYPERTENSION ASSOCIATED WITH TYPE 2 DIABETES MELLITUS: ICD-10-CM

## 2024-05-24 DIAGNOSIS — Z79.4 TYPE 2 DIABETES MELLITUS WITH HYPERGLYCEMIA, WITH LONG-TERM CURRENT USE OF INSULIN: ICD-10-CM

## 2024-05-24 DIAGNOSIS — E66.01 CLASS 3 OBESITY: ICD-10-CM

## 2024-05-24 RX ORDER — TRAZODONE HYDROCHLORIDE 100 MG/1
TABLET ORAL
Refills: 0 | OUTPATIENT
Start: 2024-05-24

## 2024-05-24 RX ORDER — GABAPENTIN 600 MG/1
600 TABLET ORAL 3 TIMES DAILY
Qty: 90 TABLET | Refills: 11 | OUTPATIENT
Start: 2024-05-24 | End: 2025-05-24

## 2024-05-24 RX ORDER — IBUPROFEN 800 MG/1
TABLET ORAL
Refills: 0 | OUTPATIENT
Start: 2024-05-24

## 2024-05-24 RX ORDER — QUETIAPINE 400 MG/1
400 TABLET, FILM COATED, EXTENDED RELEASE ORAL DAILY
Qty: 60 TABLET | Refills: 3 | OUTPATIENT
Start: 2024-05-24

## 2024-05-24 RX ORDER — OMEPRAZOLE 40 MG/1
40 CAPSULE, DELAYED RELEASE ORAL EVERY MORNING
Qty: 90 CAPSULE | Refills: 3 | OUTPATIENT
Start: 2024-05-24

## 2024-05-24 RX ORDER — INSULIN GLARGINE 100 [IU]/ML
INJECTION, SOLUTION SUBCUTANEOUS
Refills: 0 | OUTPATIENT
Start: 2024-05-24

## 2024-05-24 RX ORDER — LISINOPRIL 10 MG/1
TABLET ORAL
Refills: 0 | OUTPATIENT
Start: 2024-05-24

## 2024-05-24 RX ORDER — GABAPENTIN 400 MG/1
CAPSULE ORAL
Refills: 0 | OUTPATIENT
Start: 2024-05-24

## 2024-05-24 RX ORDER — METOPROLOL SUCCINATE 50 MG/1
TABLET, EXTENDED RELEASE ORAL
Refills: 0 | OUTPATIENT
Start: 2024-05-24

## 2024-05-24 RX ORDER — VENLAFAXINE HYDROCHLORIDE 150 MG/1
150 CAPSULE, EXTENDED RELEASE ORAL DAILY
Qty: 90 CAPSULE | Refills: 3 | OUTPATIENT
Start: 2024-05-24

## 2024-05-24 NOTE — TELEPHONE ENCOUNTER
Care Due:                  Date            Visit Type   Department     Provider  --------------------------------------------------------------------------------                                EP -                              PRIMARY      [facility] 2ND FLOOR  Last Visit: 03-      CARE ([facility])   [facility]  [place]                              EP -                              PRIMARY      [facility] 2ND FLOOR  Next Visit: 06-      CARE ([facility])   [facility]                                                            Last  Test          Frequency    Reason                     Performed    Due Date  --------------------------------------------------------------------------------    HBA1C.......  6 months...  empagliflozin, insulin,    02- 08-                             tirzepatide..............    [employer] Embedded Care Due Messages. Reference number: [id number].   5/24/2024 12:30:54 PM CDT

## 2024-05-24 NOTE — TELEPHONE ENCOUNTER
Spoke with patient and rescheduled f/u appt for next week for refills as discussed. She voiced acceptance.

## 2024-05-27 ENCOUNTER — PATIENT OUTREACH (OUTPATIENT)
Dept: DIABETES | Facility: CLINIC | Age: 54
End: 2024-05-27
Payer: MEDICARE

## 2024-05-28 ENCOUNTER — OFFICE VISIT (OUTPATIENT)
Dept: PODIATRY | Facility: CLINIC | Age: 54
End: 2024-05-28
Payer: MEDICARE

## 2024-05-28 VITALS
BODY MASS INDEX: 39.99 KG/M2 | HEART RATE: 83 BPM | HEIGHT: 69 IN | SYSTOLIC BLOOD PRESSURE: 121 MMHG | WEIGHT: 270 LBS | DIASTOLIC BLOOD PRESSURE: 77 MMHG

## 2024-05-28 DIAGNOSIS — L84 PRE-ULCERATIVE CALLUSES: ICD-10-CM

## 2024-05-28 DIAGNOSIS — G57.93 NEUROPATHIC PAIN OF BOTH FEET: Primary | ICD-10-CM

## 2024-05-28 DIAGNOSIS — L84 FOOT CALLUS: ICD-10-CM

## 2024-05-28 DIAGNOSIS — M79.674 PAIN OF TOES OF BOTH FEET: ICD-10-CM

## 2024-05-28 DIAGNOSIS — E11.42 TYPE 2 DIABETES MELLITUS WITH PERIPHERAL NEUROPATHY: ICD-10-CM

## 2024-05-28 DIAGNOSIS — M79.675 PAIN OF TOES OF BOTH FEET: ICD-10-CM

## 2024-05-28 PROCEDURE — 4010F ACE/ARB THERAPY RXD/TAKEN: CPT | Mod: CPTII,S$GLB,, | Performed by: PODIATRIST

## 2024-05-28 PROCEDURE — 3008F BODY MASS INDEX DOCD: CPT | Mod: CPTII,S$GLB,, | Performed by: PODIATRIST

## 2024-05-28 PROCEDURE — 1159F MED LIST DOCD IN RCRD: CPT | Mod: CPTII,S$GLB,, | Performed by: PODIATRIST

## 2024-05-28 PROCEDURE — 3066F NEPHROPATHY DOC TX: CPT | Mod: CPTII,S$GLB,, | Performed by: PODIATRIST

## 2024-05-28 PROCEDURE — 1160F RVW MEDS BY RX/DR IN RCRD: CPT | Mod: CPTII,S$GLB,, | Performed by: PODIATRIST

## 2024-05-28 PROCEDURE — 99999 PR PBB SHADOW E&M-EST. PATIENT-LVL III: CPT | Mod: PBBFAC,,, | Performed by: PODIATRIST

## 2024-05-28 PROCEDURE — 99214 OFFICE O/P EST MOD 30 MIN: CPT | Mod: S$GLB,,, | Performed by: PODIATRIST

## 2024-05-28 PROCEDURE — 3078F DIAST BP <80 MM HG: CPT | Mod: CPTII,S$GLB,, | Performed by: PODIATRIST

## 2024-05-28 PROCEDURE — 3074F SYST BP LT 130 MM HG: CPT | Mod: CPTII,S$GLB,, | Performed by: PODIATRIST

## 2024-05-28 PROCEDURE — 3052F HG A1C>EQUAL 8.0%<EQUAL 9.0%: CPT | Mod: CPTII,S$GLB,, | Performed by: PODIATRIST

## 2024-05-28 PROCEDURE — 3061F NEG MICROALBUMINURIA REV: CPT | Mod: CPTII,S$GLB,, | Performed by: PODIATRIST

## 2024-05-28 RX ORDER — GABAPENTIN 600 MG/1
600 TABLET ORAL 3 TIMES DAILY
Qty: 90 TABLET | Refills: 2 | Status: SHIPPED | OUTPATIENT
Start: 2024-05-28

## 2024-05-28 RX ORDER — LANCETS
EACH MISCELLANEOUS
COMMUNITY

## 2024-05-28 RX ORDER — RIZATRIPTAN BENZOATE 10 MG/1
TABLET, ORALLY DISINTEGRATING ORAL
COMMUNITY
Start: 2024-05-10 | End: 2024-05-31

## 2024-05-28 RX ORDER — BLOOD SUGAR DIAGNOSTIC
STRIP MISCELLANEOUS
COMMUNITY

## 2024-05-28 RX ORDER — PEN NEEDLE, DIABETIC 30 GX3/16"
NEEDLE, DISPOSABLE MISCELLANEOUS
COMMUNITY

## 2024-05-28 RX ORDER — PEN NEEDLE, DIABETIC 29 G X1/2"
NEEDLE, DISPOSABLE MISCELLANEOUS
COMMUNITY

## 2024-05-28 RX ORDER — OMEPRAZOLE 40 MG/1
40 CAPSULE, DELAYED RELEASE ORAL
COMMUNITY
Start: 2024-05-10

## 2024-05-28 RX ORDER — GABAPENTIN 400 MG/1
400 CAPSULE ORAL 3 TIMES DAILY
COMMUNITY
Start: 2024-04-21 | End: 2024-05-31

## 2024-05-28 RX ORDER — IBUPROFEN 800 MG/1
800 TABLET ORAL
COMMUNITY
Start: 2024-05-10

## 2024-05-28 RX ORDER — TRAZODONE HYDROCHLORIDE 100 MG/1
100 TABLET ORAL NIGHTLY PRN
COMMUNITY
Start: 2024-05-10

## 2024-05-28 RX ORDER — SYRINGE,SAFETY WITH NEEDLE,1ML 25GX1"
SYRINGE (EA) MISCELLANEOUS
COMMUNITY

## 2024-05-29 NOTE — PROGRESS NOTES
Subjective:       Patient ID: Sommer Guevara is a 54 y.o. female.    Chief Complaint: Follow-up, Diabetes Mellitus, and Foot Pain  Patient presents for  follow-up pre ulcerative calluses.  Patient relates right foot pain today, states callus on the side of the right great toe is very sensitive. Relates more nerve pain in her feet,   Relates right foot is much worse than the left.   Reports pain level 8/10  Confirms taking 400 mg gabapentin 3 times daily, initially helpful, well tolerated with no side effects  Relates diabetes is improving with change in diet and weight loss.  Trying to stay active but difficult with foot pain      Past Medical History:   Diagnosis Date    Arthritis     Diabetes     Gall bladder disease     Hypertension     Liver disease     Lung disease     Neuropathy     Psychiatric illness     Reflux esophagitis      Past Surgical History:   Procedure Laterality Date     SECTION      KNEE SURGERY       No family history on file.  Social History     Socioeconomic History    Marital status: Single   Tobacco Use    Smoking status: Every Day   Substance and Sexual Activity    Alcohol use: Not Currently    Drug use: Not Currently    Sexual activity: Not Currently     Social Determinants of Health     Financial Resource Strain: Low Risk  (2023)    Received from [facility], [facility] and [facility]    Overall Financial Resource Strain (CARDIA)     Difficulty of Paying Living Expenses: Not very hard   Food Insecurity: No Food Insecurity (2023)    Received from [facility], [facility] and [facility]    Hunger Vital Sign     Worried About Running Out of Food in the Last Year: Never true     Ran Out of Food in the Last Year: Never true   Recent Concern: Food Insecurity - Food Insecurity Present (3/14/2023)    Received from [facility]  Hospital    Hunger Vital Sign     Worried About Running Out of Food in the Last Year: Often true     Ran Out of Food in the Last Year: Never true   Transportation Needs: Unmet Transportation Needs (6/5/2023)    Received from [facility], [facility] and [facility]    PRAPARE - Transportation     Lack of Transportation (Medical): Yes     Lack of Transportation (Non-Medical): Yes   Physical Activity: Inactive (5/3/2021)    [facility], [facility]    Exercise Vital Sign     Days of Exercise per Week: 0 days     Minutes of Exercise per Session: 0 min   Stress: No Stress Concern Present (5/3/2021)    [facility] from [facility], [facility]    [employer] - Occupational Stress Questionnaire     Feeling of Stress : Not at all   Housing Stability: Low Risk  (6/5/2023)    Received from [facility], [facility] and [facility]    Housing Stability Vital Sign     Unable to Pay for Housing in the Last Year: No     Number of Places Lived in the Last Year: 1     In the last 12 months, was there a time when you did not have a steady place to sleep or slept in a shelter (including now)?: No       Current Outpatient Medications   Medication Sig Dispense Refill    ibuprofen (ADVIL,MOTRIN) 800 MG tablet Take 800 mg by mouth.      omeprazole (PRILOSEC) 40 MG capsule Take 40 mg by mouth.      rizatriptan (MAXALT-MLT) 10 MG disintegrating tablet Take by mouth.      traZODone (DESYREL) 100 MG tablet Take 100 mg by mouth nightly as needed.      albuterol-ipratropium (DUO-NEB) 2.5 mg-0.5 mg/3 mL nebulizer solution Take 3 mLs by nebulization every 6 (six) hours as needed for Wheezing. Rescue 75 mL 0    amantadine HCL (SYMMETREL) 100 mg capsule  Take 100 mg by mouth 3 (three) times daily.      amitriptyline (ELAVIL) 100 MG tablet Take 100 mg by mouth every evening.      amitriptyline (ELAVIL) 150 MG Tab Take 150 mg by mouth every evening.      aspirin (ECOTRIN) 81 MG EC tablet Take 81 mg by mouth once daily.      AUSTEDO XR TITRATION KT,WK1-4, 6 mg (14)-12 mg (14)-24 mg (14) T24d Take 6 mg by mouth once daily.      benzonatate (TESSALON) 200 MG capsule Take 1 capsule (200 mg total) by mouth 3 (three) times daily as needed for Cough. 60 capsule 1    blood sugar diagnostic (TRUE METRIX GLUCOSE TEST STRIP MISC) Take 1 strip(s) 3 times a day by miscell. route for 30 days.      blood sugar diagnostic Strp To check BG 2 times daily, to use with insurance preferred meter 100 each 11    blood-glucose meter (TRUE METRIX GLUCOSE METER MISC) Use as directed.      blood-glucose meter kit To check BG 2 times daily, to use with insurance preferred meter 1 each 0    budesonide-glycopyr-formoterol (BREZTRI AEROSPHERE) 160-9-4.8 mcg/actuation HFAA Inhale 2 puffs into the lungs 2 (two) times daily. 8.5 g 3    carbamazepine (TEGRETOL XR) 100 MG 12 hr tablet Take 100 mg by mouth 2 (two) times daily.      diclofenac sodium (VOLTAREN) 1 % Gel Apply 2 g topically 4 (four) times daily. 1 each 3    docusate sodium (COLACE) 100 MG capsule Take 100 mg by mouth.      empagliflozin (JARDIANCE) 25 mg tablet Take 1 tablet (25 mg total) by mouth once daily. 90 tablet 3    fluticasone propionate (FLONASE) 50 mcg/actuation nasal spray 1 spray (50 mcg total) by Each Nostril route once daily. 18.4 mL 0    furosemide (LASIX) 20 MG tablet Take 20 mg by mouth once daily.      gabapentin (NEURONTIN) 400 MG capsule Take 400 mg by mouth 3 (three) times daily.      gabapentin (NEURONTIN) 600 MG tablet Take 1 tablet (600 mg total) by mouth 3 (three) times daily. 90 tablet 11    gabapentin (NEURONTIN) 600 MG tablet Take 1 tablet (600 mg total) by mouth 3 (three) times daily. 90 tablet 2     "HYDROcodone-acetaminophen (NORCO) 5-325 mg per tablet Take 1 tablet by mouth every 8 (eight) hours as needed for Pain. 12 tablet 0    insulin glargine (LANTUS U-100 INSULIN) 100 unit/mL injection Inject 10 Units into the skin once daily. 3 mL 11    insulin syringe-needle U-100 1 mL 31 gauge x 5/16 Syrg       lancets (THIN LANCETS) 26 gauge Misc USE 3 TO 4 TIMES DAILY      lancets Misc To check BG 2 times daily, to use with insurance preferred meter 100 each 11    lisinopriL 10 MG tablet Take 1 tablet (10 mg total) by mouth once daily. 90 tablet 3    loratadine (CLARITIN) 10 mg tablet Take 1 tablet (10 mg total) by mouth daily as needed for Allergies. 60 tablet 0    metoprolol succinate (TOPROL-XL) 50 MG 24 hr tablet Take 50 mg by mouth once daily.      nebulizer and compressor Alena 1 each by Misc.(Non-Drug; Combo Route) route 2 (two) times a day. 1 each 0    omeprazole (PRILOSEC) 20 MG capsule Take 20 mg by mouth once daily.      ondansetron (ZOFRAN) 4 MG tablet Take 4 mg by mouth every 8 (eight) hours.      oxybutynin (DITROPAN XL) 15 MG TR24 Take 15 mg by mouth once daily.      pen needle, diabetic (BD ULTRA-FINE KENDALL PEN NEEDLE) 32 gauge x 5/32" Ndle       pen needle, diabetic (ULTICARE PEN NEEDLE) 31 gauge x 1/4" Ndle       pen needle, diabetic 32 gauge x 1/4" Ndle       potassium chloride (KLOR-CON) 8 MEQ TbSR Take 8 mEq by mouth once daily.      pravastatin (PRAVACHOL) 40 MG tablet Take 1 tablet by mouth every evening.      QUEtiapine (SEROQUEL XR) 400 MG Tb24 Take 400 mg by mouth every evening.      quetiapine (SEROQUEL) 200 MG Tab Take by mouth.      tirzepatide 7.5 mg/0.5 mL PnIj Inject 7.5 mg into the skin every 7 days. 4 Pen 0    venlafaxine (EFFEXOR-XR) 150 MG Cp24 Take 150 mg by mouth.       No current facility-administered medications for this visit.     Review of patient's allergies indicates:   Allergen Reactions    Glucosamine Swelling     Reports allergy to seafood made her throat swell    " "Guaifenesin Swelling    Hyoscyamine sulfate Rash and Swelling    Sulfa (sulfonamide antibiotics) Swelling and Rash    Aspirin     Aspirin,buffd-calcium carb-mag Other (See Comments)     gi bleed    Bactrim [sulfamethoxazole-trimethoprim]     Chlorquinaldol     Clarithromycin Other (See Comments)     Causes yeast infection    Clozaril [clozapine]     Compazine [prochlorperazine edisylate]     Haldol [haloperidol lactate]     Imitrex [sumatriptan succinate]     Keflex [cephalexin]     Latex Other (See Comments)     Pt says "puss started oozing from my hands after wearing gloves."    Liraglutide Hives    Medrol [methylprednisolone]     Penicillins     Doxycycline Rash    Egg derived Diarrhea    Hydroxyzine Rash     Received PO hydroxyzine 3/11/23 w/ no issues    Levofloxacin Nausea And Vomiting and Rash    Morphine Itching and Nausea And Vomiting    Nitrofurantoin Rash       Review of Systems   Endocrine:        Diabetes x 15 years   Musculoskeletal:  Positive for gait problem.   All other systems reviewed and are negative.      Objective:      Vitals:    05/28/24 1017   BP: 121/77   Pulse: 83   Weight: 122.5 kg (270 lb)   Height: 5' 9" (1.753 m)     Physical Exam  Vitals and nursing note reviewed.   Cardiovascular:      Pulses:           Dorsalis pedis pulses are 2+ on the right side and 2+ on the left side.        Posterior tibial pulses are 2+ on the right side and 2+ on the left side.   Pulmonary:      Effort: Pulmonary effort is normal.   Musculoskeletal:         General: Tenderness present.      Right foot: Deformity (pronation b/l) present.      Left foot: Deformity present.   Feet:      Right foot:      Skin integrity: Ulcer, callus (Callus medial IPJ right hallux with some discoloration right foot painful/ sensitive today without skin opening) and dry skin present.      Left foot:      Skin integrity: Callus (tender chronic hyperkeratotic lesion mildly tender medial left hallux without discoloration) and dry " skin present.   Skin:     Capillary Refill: Capillary refill takes 2 to 3 seconds.   Neurological:      General: No focal deficit present.      Mental Status: She is alert.      Comments: Responds to stimuli with testing sensation all areas bilateral feet                            Assessment:       1. Neuropathic pain of both feet    2. Pain of toes of both feet    3. Pre-ulcerative calluses - Right Foot    4. Foot callus - Left Foot    5. Type 2 diabetes mellitus with peripheral neuropathy          Plan:          GABAPENTIN 600 MG T.I.D.        Reviewed neuropathy at length with patient  Advised she most likely has some inflammation of the nerves right foot as well which is causing increased nerve pain in this foot as compared to the left.  We reviewed underlying neuropathy and a more acute neuritis in the difference between these 2 conditions  Reviewed with patient ice/cool therapy in frequency this should be performed as long as well tolerated  Reviewed over-the-counter Voltaren gel as directed  Strongly recommended over-the-counter 4% lidocaine patches directed right foot, apply 1 during the day, 1 overnight   We reviewed an increase in gabapentin which patient was supposed to discuss with her PCP last visit increased from 4-600 mg  3 times daily.  We discussed potential side effects  Had a lengthy discussion regarding other conservative treatments for neuropathy, most effective  when performed every evening  Reviewed chronic calluses and why the right is pre ulcerative.  Pointed out to patient discoloration within this callus  Instructed patient on use of moisturizer,  reviewed multiple options  Calluses debrided, no skin opening  Reviewed flat feet, arch supports  Patient relates she is getting better/ new tennis shoes  Instructed patient to bring them with her on her next visit, we will discuss arch supports further  top try take pressure off these areas.  Advised patient list there is a change with using a  moisturizer, tennis shoes and arch supports these areas will continue to occur  Discussed using a  over-the-counter lidocaine patch as directed over this area on the right great toe which was most sensitive location today  Reviewed importance of controled glucose/diabetes regarding potential foot problems especially neuropathy  Reviewed diabetic education  Reviewed need for daily foot checks and instructed patient to contact the office with any area of concern which has not improved in a few days.  Patient was in understanding and agreement with treatment plan.  I counseled the patient on their conditions, implications and medical management.  Instructed patient to contact the office with any changes, questions, concerns, worsening of symptoms.   Total face to face time 30 minutes, exam, assessment, treatment, discussion, additional time for review of chart prior to and following appointment and visit documentation, consultation and coordination of care.    Follow up as needed    This note was created using M*Modal voice recognition software that occasionally misinterpreted phrases or words.

## 2024-05-31 ENCOUNTER — PATIENT OUTREACH (OUTPATIENT)
Dept: ADMINISTRATIVE | Facility: HOSPITAL | Age: 54
End: 2024-05-31
Payer: MEDICARE

## 2024-05-31 ENCOUNTER — OFFICE VISIT (OUTPATIENT)
Dept: FAMILY MEDICINE | Facility: CLINIC | Age: 54
End: 2024-05-31
Payer: MEDICARE

## 2024-05-31 VITALS
HEART RATE: 88 BPM | SYSTOLIC BLOOD PRESSURE: 98 MMHG | OXYGEN SATURATION: 96 % | BODY MASS INDEX: 40.17 KG/M2 | DIASTOLIC BLOOD PRESSURE: 76 MMHG | WEIGHT: 271.19 LBS | HEIGHT: 69 IN

## 2024-05-31 DIAGNOSIS — F17.218 NICOTINE DEPENDENCE, CIGARETTES, WITH OTHER NICOTINE-INDUCED DISORDERS: ICD-10-CM

## 2024-05-31 DIAGNOSIS — E11.65 TYPE 2 DIABETES MELLITUS WITH HYPERGLYCEMIA, WITH LONG-TERM CURRENT USE OF INSULIN: ICD-10-CM

## 2024-05-31 DIAGNOSIS — Z79.4 TYPE 2 DIABETES MELLITUS WITH HYPERGLYCEMIA, WITH LONG-TERM CURRENT USE OF INSULIN: ICD-10-CM

## 2024-05-31 DIAGNOSIS — I15.2 HYPERTENSION ASSOCIATED WITH TYPE 2 DIABETES MELLITUS: ICD-10-CM

## 2024-05-31 DIAGNOSIS — N32.81 OAB (OVERACTIVE BLADDER): ICD-10-CM

## 2024-05-31 DIAGNOSIS — E11.69 HYPERLIPIDEMIA ASSOCIATED WITH TYPE 2 DIABETES MELLITUS: Primary | ICD-10-CM

## 2024-05-31 DIAGNOSIS — E78.5 HYPERLIPIDEMIA ASSOCIATED WITH TYPE 2 DIABETES MELLITUS: Primary | ICD-10-CM

## 2024-05-31 DIAGNOSIS — E11.59 HYPERTENSION ASSOCIATED WITH TYPE 2 DIABETES MELLITUS: ICD-10-CM

## 2024-05-31 PROCEDURE — 3061F NEG MICROALBUMINURIA REV: CPT | Mod: CPTII,S$GLB,, | Performed by: STUDENT IN AN ORGANIZED HEALTH CARE EDUCATION/TRAINING PROGRAM

## 2024-05-31 PROCEDURE — 1159F MED LIST DOCD IN RCRD: CPT | Mod: CPTII,S$GLB,, | Performed by: STUDENT IN AN ORGANIZED HEALTH CARE EDUCATION/TRAINING PROGRAM

## 2024-05-31 PROCEDURE — 99406 BEHAV CHNG SMOKING 3-10 MIN: CPT | Mod: S$GLB,,, | Performed by: STUDENT IN AN ORGANIZED HEALTH CARE EDUCATION/TRAINING PROGRAM

## 2024-05-31 PROCEDURE — 3074F SYST BP LT 130 MM HG: CPT | Mod: CPTII,S$GLB,, | Performed by: STUDENT IN AN ORGANIZED HEALTH CARE EDUCATION/TRAINING PROGRAM

## 2024-05-31 PROCEDURE — 3052F HG A1C>EQUAL 8.0%<EQUAL 9.0%: CPT | Mod: CPTII,S$GLB,, | Performed by: STUDENT IN AN ORGANIZED HEALTH CARE EDUCATION/TRAINING PROGRAM

## 2024-05-31 PROCEDURE — 3008F BODY MASS INDEX DOCD: CPT | Mod: CPTII,S$GLB,, | Performed by: STUDENT IN AN ORGANIZED HEALTH CARE EDUCATION/TRAINING PROGRAM

## 2024-05-31 PROCEDURE — 4010F ACE/ARB THERAPY RXD/TAKEN: CPT | Mod: CPTII,S$GLB,, | Performed by: STUDENT IN AN ORGANIZED HEALTH CARE EDUCATION/TRAINING PROGRAM

## 2024-05-31 PROCEDURE — 3078F DIAST BP <80 MM HG: CPT | Mod: CPTII,S$GLB,, | Performed by: STUDENT IN AN ORGANIZED HEALTH CARE EDUCATION/TRAINING PROGRAM

## 2024-05-31 PROCEDURE — 3066F NEPHROPATHY DOC TX: CPT | Mod: CPTII,S$GLB,, | Performed by: STUDENT IN AN ORGANIZED HEALTH CARE EDUCATION/TRAINING PROGRAM

## 2024-05-31 PROCEDURE — 99214 OFFICE O/P EST MOD 30 MIN: CPT | Mod: 25,S$GLB,, | Performed by: STUDENT IN AN ORGANIZED HEALTH CARE EDUCATION/TRAINING PROGRAM

## 2024-05-31 PROCEDURE — 99999 PR PBB SHADOW E&M-EST. PATIENT-LVL V: CPT | Mod: PBBFAC,,, | Performed by: STUDENT IN AN ORGANIZED HEALTH CARE EDUCATION/TRAINING PROGRAM

## 2024-05-31 RX ORDER — LISINOPRIL 10 MG/1
10 TABLET ORAL DAILY
Qty: 90 TABLET | Refills: 3 | Status: SHIPPED | OUTPATIENT
Start: 2024-05-31 | End: 2025-05-31

## 2024-05-31 RX ORDER — INSULIN GLARGINE 100 [IU]/ML
10 INJECTION, SOLUTION SUBCUTANEOUS DAILY
Qty: 3 ML | Refills: 11 | Status: CANCELLED | OUTPATIENT
Start: 2024-05-31 | End: 2025-05-31

## 2024-05-31 RX ORDER — METOPROLOL SUCCINATE 50 MG/1
50 TABLET, EXTENDED RELEASE ORAL DAILY
Qty: 90 TABLET | Refills: 3 | Status: SHIPPED | OUTPATIENT
Start: 2024-05-31

## 2024-05-31 RX ORDER — INSULIN GLARGINE 100 [IU]/ML
10 INJECTION, SOLUTION SUBCUTANEOUS DAILY
Qty: 15 ML | Refills: 0 | Status: SHIPPED | OUTPATIENT
Start: 2024-05-31 | End: 2025-05-31

## 2024-05-31 RX ORDER — OXYBUTYNIN CHLORIDE 15 MG/1
15 TABLET, EXTENDED RELEASE ORAL DAILY
Qty: 90 TABLET | Refills: 3 | Status: SHIPPED | OUTPATIENT
Start: 2024-05-31

## 2024-05-31 RX ORDER — PRAVASTATIN SODIUM 40 MG/1
40 TABLET ORAL NIGHTLY
Qty: 90 TABLET | Refills: 3 | Status: SHIPPED | OUTPATIENT
Start: 2024-05-31

## 2024-05-31 NOTE — PROGRESS NOTES
Population Health Chart Review & Patient Outreach Details      Additional [facility] Health Notes:               Updates Requested / Reviewed:      Updated Care Coordination Note, Care Everywhere, Care Team Updated, and Immunizations Reconciliation Completed or Queried: [place]         Health Maintenance Topics Overdue:      [facility] Score: 2     Eye Exam  Mammogram                       Health Maintenance Topic(s) Outreach Outcomes & Actions Taken:    Eye Exam - Outreach Outcomes & Actions Taken  : External Records Requested & Care Team Updated if Applicable

## 2024-05-31 NOTE — LETTER
"       AUTHORIZATION FOR RELEASE OF   CONFIDENTIAL INFORMATION    Dear [facility],    We are seeing Sommer Guevara, date of birth 1970, in the clinic at [facility] 2ND FLOOR FAMILY MEDICINE. Ivelisse Garland MD is the patient's PCP. Sommer Guevara has an outstanding lab/procedure at the time we reviewed her chart. In order to help keep her health information updated, she has authorized us to request the following medical record(s):                                                ( X )  EYE EXAM                Please fax records to [facility], Tsai, Kang-Lin, MD, [phone] or [phone].     Thank you  Anel Flores JOEL  Clinical Care Coordinator  [facility]            Patient Name: Sommer Guevara  : 1970  Patient Phone #: [phone]                      A. Consent for Examination and Treatment: I hereby authorize the providers and employees of [facility] ("[facility] to provide medical treatment/services which includes, but is not limited to, performing and administering tests and diagnostic procedures that are deemed necessary, Including, but not limited to, imaging examinations, blood tests and other laboratory procedures as may be required by the hospital, clinic, or may be ordered by my physician(s) or persons working under the general and/or special instructions of my physician(s).                   1.      I understand and agree that this consent covers all authorized persons, including but not limited to physicians, residents, nurse practitioners, physicians' assistants, specialists, consultants, student nurses, and independently contracted physicians, who are called upon by the physician in charge, to carry out the diagnostic procedures and medical or surgical treatment.  2.      I hereby authorize [facility] to retain or dispose of any specimens or tissue, should there be such remaining from any test or procedure.  3.      I hereby authorize and give consent " for [facility] providers and employees to take photographs, images or videotapes of such diagnostic, surgical or treatment procedures of Patient as may be required by [facility] or as may be ordered by a physician.  I further acknowledge and agree that [facility] may use cameras or other devices for patient monitoring.  4.      I am aware that the practice of medicine is not an exact science, and I acknowledge that no guarantees have been made to me as to the outcome of any tests, procedures or treatment.                   B. Authorization for Release of Information:  I understand that my insurance company and/or their agents may need information necessary to make determinations about payment/reimbursement.  I hereby provide authorization to release to all insurance companies, their successors, assignees, other parties with whom they may have contracted, or others acting on their behalf, that are involved with payment for any hospital and/or clinic charges incurred by the patient, any information that they request and deem necessary for payment/reimbursement, and/or quality review.  I further authorize the release of my health information to physicians or other health care practitioners on staff who are involved in my health care now and in the future, and to other health care providers, entities, or institutions for the purpose of my continued care and treatment, including referrals.     C. Medicare Patient's Certification and Authorization to Release Information and Payment Request:  I certify that the information given by me in applying for payment under Title XVIII of the Social Security Act is correct.  I authorize any harp of medical or other information about me to release to the Social Security Administration, or its intermediaries or carriers, any information needed for this or a related Medicare claim.  I request that payment of authorized benefits be made on my behalf.     D. Assignment of Insurance Benefits:  I  hereby authorize any and all insurance companies, health plans, defined benefit plans, health insurers or any entity that is or may be responsible for payment of my medical expenses to pay all hospital and medical benefits now due, and to become due and payable to me under any hospital benefits, sick benefits, injury benefits or any other benefit for services rendered to me, including Major Medical Benefits, direct to [facility] and all independently contracted physicians.  I assign any and all rights that I may have against any and all insurance companies, health plans, defined benefit plans, health insurers or any entity that is or may be responsible for payment of my medical expenses, including, but not limited to any right to appeal a denial of a claim, any right to bring any action, lawsuit, administrative proceeding, or other cause of action on my behalf.  I specifically assign my right to pursue litigation against any and all insurance companies, health plans, defined benefit plans, health insurers or any entity that is or may be responsible for payment of medical expenses based upon a refusal to pay charges.              REGISTRATION  AUTHORIZATION                    E. Valuables:  It is understood and agreed that [facility] is not liable for the damage to or loss of any money, jewelry, documents, dentures, eye glasses, hearing aids, prosthetics, or other property of value.     F. Computer Equipment:  I understand and agree that should I choose to use computer equipment owned by [facility] or if I choose to access the Internet via [facility]'s network, I do so at my own risk.  [facility] is not responsible for any damage to my computer equipment or to any damages of any type that might arise from my loss of equipment or data.                   G. Acceptance of Financial Responsibility:  I agree that in consideration of the services and supplies that have been or will be furnished to the patient,  I am hereby obligated to  pay all charges made for or on the account of the patient according to the standard rates (in effect at the time the services and supplies are delivered) established by [facility], including its Patient Financial Assistance Policy to the extent it is applicable.  I understand that I am responsible for all charges, or portions thereof, not covered by insurance or other sources.  Patient refunds will be distributed only after balances at all [facility] facilities are paid.                   H. Communication Authorization:  I hereby authorize [facility] and its representatives, along with any billing service or  who may work on their behalf, to contact me on my cell phone and/or home phone using prerecorded messages, artificial voice messages, automatic telephone dialing devices or other computer assisted technology, or by electronic mail, text messaging, or by any other form of electronic communication.  This includes, but is not limited to appointment reminders, yearly physical exam reminders, preventive care reminders, patient campaigns, welcome calls, and calls about account balances on my account or any account on which I am listed as a guarantor.  I understand I have the right to opt out of these communications at any time.     I.  Relationship Between Facility and Physician:  I understand that some, but not all, providers furnishing services to the patient are not employees or agents of [facility].  The patient is under the care and supervision of his/her attending physician, and it is the responsibility of the facility and its nursing staff to carry out the instructions of such physicians.  It is the responsibility of the patient's physician/designee to obtain the patient's informed consent, when required, for medical or surgical treatment, special diagnostic or therapeutic procedures, or hospital services rendered for the patient under the special instructions of the physician/designee.     J. Notice of  Private Practices:  I acknowledge I have received a copy of [facility]'s Notice of Privacy Practices.     K. Facility Directory:  I have discussed with the organization my desire to be either included or excluded in the facility directory.  I understand that if my choice is to opt-out of being identified in the facility directory that the facility will not provide any information about me such as my condition (e.g. Fair, stable, etc.) or my location in the facility (e.g. Room number, department).     L. LINKS:  For [place] Residents:  [facility] is a LINKS ([place] Immunization Network for Kids Statewide) participating facility.  LINKS is a [facility]-sponsored confidential computer system that helps you and your doctor keep track of you and your child's immunization history.  I acknowledge that I am allowing [facility] to share this information with [facility].  For [place] Residents:  [facility] is a MIIX ([place] Immunization Information eXchange) participant.  [facility] is a [place] Department of Health-sponsored confidential computer system that helps you and your doctor keep track of you and your child's immunization history.  I acknowledge that I am allowing [facility] to share information with [facility].     M. Term:  This authorization is valid for this and subsequent care/treatment I receive at [facility] and will remain valid unless/until revoked in writing by me.      ACKNOWLEDGMENT OF POTENTIAL RISKS OF COVID-19 VACCINE  It is important that you, as the patient or patients legally authorized representative(s), understand and acknowledge the following, with regard to administration of the COVID-19 vaccine offered by [facility]:  The SARS-CoV-2 virus (COVID-19) has caused an unprecedented modern global pandemic that has mobilized scientists and drug manufacturers to work to create safe and effective vaccines to get the crisis under control.  No vaccine is released in the [place] without undergoing rigorous,  multi-layered testing and approval by the Food and Drug Administration.  During a public health emergency, however, vaccines can be released for patient administration by the FDA prior to completion of multi-phase clinical trials and approval. This is done by the FDAs granting of Emergency Use Authorization (EUA) when the vaccine meets reasonable thresholds for safety and effectiveness and people are in urgent need of care. Under an EUA, the FDA has found that known and potential benefits outweigh its known and potential risks.  The vaccine for which you are presenting to [facility] has been released under an EUA, which [facility] is honoring in its distribution of the vaccine to the public. While the FDAs authorization indicates its belief that usage is recommended over possible risks, there is still the possibility that unknown risks of the vaccine could exist.  By signing this document, you acknowledge and assume these risks. Further, you waive any and all claims of liability against and hold harmless any [facility] entity or provider for any harm caused to you by said possible unknown risks of the vaccine.        N. [facility]:  As used in this document, [facility] means all [facility] affiliated entities including all health centers, surgery centers, clinics, and hospitals.  It includes more specifically, the following entities: [facility], a not for profit [facility], and its subsidiaries and affiliates, including [facility], [facility], [facility], [facility], [facility], [facility]d/b/a [facility], L.L.C.d/b/a [facility] [employer] As manager of Arm[facility] TINAJ[facility], [facility], [facility], [facility], L.L.C.d/b/a [facility], [facility] and [facility] as manager of [facility].                                                                Patient/Legal Guardian Signature                                                    This signature was collected at 05/04/2024      jose francisco johnson/[facility]                                                                            Printed Name/Relationship to Patient

## 2024-05-31 NOTE — PROGRESS NOTES
[facility] Note    Subjective:    The HPI and pertinent ROS is included in the Diagnostic Impression Remarks section at the end of the note. Please see below for further details. Chief complaint is at end of note.     Sommer is a pleasant intelligent patient who is here for evaluation.     Modified Medications    Modified Medication Previous Medication    LISINOPRIL 10 MG TABLET lisinopriL 10 MG tablet       Take 1 tablet (10 mg total) by mouth once daily.    Take 1 tablet (10 mg total) by mouth once daily.    METOPROLOL SUCCINATE (TOPROL-XL) 50 MG 24 HR TABLET metoprolol succinate (TOPROL-XL) 50 MG 24 hr tablet       Take 1 tablet (50 mg total) by mouth once daily.    Take 50 mg by mouth once daily.    OXYBUTYNIN (DITROPAN XL) 15 MG TR24 oxybutynin (DITROPAN XL) 15 MG TR24       Take 1 tablet (15 mg total) by mouth once daily.    Take 15 mg by mouth once daily.    PRAVASTATIN (PRAVACHOL) 40 MG TABLET pravastatin (PRAVACHOL) 40 MG tablet       Take 1 tablet (40 mg total) by mouth every evening.    Take 1 tablet by mouth every evening.       Data reviewed 274}  Previous medical records reviewed and summarized in plan section at end of note.      If you are due for any health screening(s) below please notify me so we can arrange them to be ordered and scheduled. Most healthy patients at your age complete them, but you are free to accept or refuse. If you can't do it, I'll definitely understand. If you can, I'd certainly appreciate it!     Tests to Keep You Healthy    Mammogram: ORDERED BUT NOT SCHEDULED  Eye Exam: ORDERED BUT NOT SCHEDULED  Colon Cancer Screening: Met on 1/6/2022  Last Blood Pressure <= 139/89 (5/31/2024): Yes  Last HbA1c < 8 (02/20/2024): NO      The following portions of the patient's history were reviewed and updated as appropriate: allergies, current medications, past family history, past medical history, past social history, past surgical history and problem list.    She  has a  "past medical history of Arthritis, Diabetes, Gall bladder disease, Hypertension, Liver disease, Lung disease, Neuropathy, Psychiatric illness, and Reflux esophagitis.  She  has a past surgical history that includes Knee surgery () and  section ().    She  reports that she has been smoking cigarettes. She started smoking about 38 years ago. She has a 76.8 pack-year smoking history. She does not have any smokeless tobacco history on file. She reports that she does not currently use alcohol. She reports that she does not currently use drugs.  She family history is not on file.    Review of patient's allergies indicates:   Allergen Reactions    Glucosamine Swelling     Reports allergy to seafood made her throat swell    Guaifenesin Swelling    Hyoscyamine sulfate Rash and Swelling    Sulfa (sulfonamide antibiotics) Swelling and Rash    Aspirin     Aspirin,buffd-calcium carb-mag Other (See Comments)     gi bleed    Bactrim [sulfamethoxazole-trimethoprim]     Chlorquinaldol     Clarithromycin Other (See Comments)     Causes yeast infection    Clozaril [clozapine]     Compazine [prochlorperazine edisylate]     Haldol [haloperidol lactate]     Imitrex [sumatriptan succinate]     Keflex [cephalexin]     Latex Other (See Comments)     Pt says "puss started oozing from my hands after wearing gloves."    Liraglutide Hives    Medrol [methylprednisolone]     Penicillins     Doxycycline Rash    Egg derived Diarrhea    Hydroxyzine Rash     Received PO hydroxyzine 3/11/23 w/ no issues    Levofloxacin Nausea And Vomiting and Rash    Morphine Itching and Nausea And Vomiting    Nitrofurantoin Rash       Tobacco Use: High Risk (2024)    Patient History     Smoking Tobacco Use: Every Day     Smokeless Tobacco Use: Unknown     Passive Exposure: Not on file     Physical Examination  General appearance: alert, cooperative, no distress  Neck: no thyromegaly, no neck stiffness  Lungs: clear to auscultation, no wheezes, " "rales or rhonchi, symmetric air entry  Heart: normal rate, regular rhythm, normal S1, S2, no murmurs, rubs, clicks or gallops  Abdomen: soft, nontender, nondistended, no rigidity, rebound, or guarding.   Back: no point tenderness over spine  Extremities: peripheral pulses normal, no unilateral leg swelling or calf tenderness   Neurological:alert, oriented, normal speech, no new focal findings or movement disorder noted from baseline    BP Readings from Last 3 Encounters:   05/31/24 98/76   05/28/24 121/77   05/05/24 130/70     Wt Readings from Last 3 Encounters:   05/31/24 123 kg (271 lb 3.2 oz)   05/28/24 122.5 kg (270 lb)   05/04/24 122.5 kg (270 lb)     BP 98/76 (BP Location: Left arm, Patient Position: Sitting, BP Method: Large (Manual))   Pulse 88   Ht 5' 9" (1.753 m)   Wt 123 kg (271 lb 3.2 oz)   SpO2 96%   BMI 40.05 kg/m²    274}  Laboratory: I have reviewed old labs below:    274}    Lab Results   Component Value Date    WBC 10.73 05/04/2024    HGB 14.6 05/04/2024    HCT 43.3 05/04/2024    MCV 88 05/04/2024     05/04/2024     05/04/2024    K 4.0 05/04/2024     05/04/2024    CALCIUM 9.5 05/04/2024    CO2 23 05/04/2024     (H) 05/04/2024    BUN 16 05/04/2024    CREATININE 1.1 05/04/2024    EGFRNORACEVR >60.0 05/04/2024    ANIONGAP 12 05/04/2024    PROT 6.5 05/04/2024    ALBUMIN 3.4 (L) 05/04/2024    BILITOT 0.2 05/04/2024    ALKPHOS 123 05/04/2024    ALT 19 05/04/2024    AST 12 05/04/2024    INR 0.99 04/05/2020    CHOL 158 02/20/2024    TRIG 187 (H) 02/20/2024    HDL 36 (L) 02/20/2024    LDLCALC 84.6 02/20/2024    TSH 1.023 02/20/2024    HGBA1C 8.2 (H) 02/20/2024      Lab reviewed by me: Particular labs of significance that I will monitor, workup, or treat to improve are mentioned below in diagnostic impression remarks.    Imaging/EKG: I have reviewed the pertinent results and my findings are noted in remarks.  274}    CC:   Chief Complaint   Patient presents with    Medication " "Refill        274}    Assessment/Plan  Sommer Guevara is a 54 y.o. female who presents to clinic with:  1. Hyperlipidemia associated with type 2 diabetes mellitus    2. Hypertension associated with type 2 diabetes mellitus    3. Type 2 diabetes mellitus with hyperglycemia, with long-term current use of insulin    4. OAB (overactive bladder)    5. Nicotine dependence, cigarettes, with other nicotine-induced disorders       274}  Diagnostic Impression Remarks + HPI     Documentation entered by me for this encounter may have been done in part using speech-recognition technology. Although I have made an effort to ensure accuracy, "sound like" errors may exist and should be interpreted in context.      Patient presents here for hospital follow up   Patient was inpatient psychiatry d/t SI   Patient has since discharged to outpatient intensive therapy 4x/week  Patient feels better and is stable   HTN: wnl. Controlled. Continue. Consider lowering dose if continues to soft bp  DM: improved significantly since it was 200-300. With not averaging 140s fasting with highest in th 200s 1x/week  OAB: controlled w/ oxybutynin    Additional workup planned: see labs ordered below.    See below for labs and meds ordered with associated diagnosis      1. Hypertension associated with type 2 diabetes mellitus  - lisinopriL 10 MG tablet; Take 1 tablet (10 mg total) by mouth once daily.  Dispense: 90 tablet; Refill: 3  - metoprolol succinate (TOPROL-XL) 50 MG 24 hr tablet; Take 1 tablet (50 mg total) by mouth once daily.  Dispense: 90 tablet; Refill: 3    2. Type 2 diabetes mellitus with hyperglycemia, with long-term current use of insulin  - insulin glargine U-100, Lantus, (LANTUS SOLOSTAR U-100 INSULIN) 100 unit/mL (3 mL) InPn pen; Inject 10 Units into the skin once daily.  Dispense: 15 mL; Refill: 0    3. Hyperlipidemia associated with type 2 diabetes mellitus  - pravastatin (PRAVACHOL) 40 MG tablet; Take 1 tablet (40 mg total) by " mouth every evening.  Dispense: 90 tablet; Refill: 3    4. OAB (overactive bladder)  - oxybutynin (DITROPAN XL) 15 MG TR24; Take 1 tablet (15 mg total) by mouth once daily.  Dispense: 90 tablet; Refill: 3    5. Nicotine dependence, cigarettes, with other nicotine-induced disorders  - Ambulatory referral/consult to Smoking Cessation Program; Future  - CT Chest Lung Screening Low Dose; Future      Ivelisse Garland MD   274}    If you are due for any health screening(s) below please notify me so we can arrange them to be ordered and scheduled. Most healthy patients at your age complete them, but you are free to accept or refuse.     If you can't do it, I'll definitely understand. If you can, I'd certainly appreciate it!   Tests to Keep You Healthy    Mammogram: ORDERED BUT NOT SCHEDULED  Eye Exam: ORDERED BUT NOT SCHEDULED  Colon Cancer Screening: Met on 1/6/2022  Last Blood Pressure <= 139/89 (5/31/2024): Yes  Last HbA1c < 8 (02/20/2024): NO    Tobacco counseling 6 minutes

## 2024-05-31 NOTE — PATIENT INSTRUCTIONS
Rashi Novoa,     If you are due for any health screening(s) below please notify me so we can arrange them to be ordered and scheduled. Most healthy patients at your age complete them, but you are free to accept or refuse.     If you can't do it, I'll definitely understand. If you can, I'd certainly appreciate it!    Tests to Keep You Healthy    Mammogram: ORDERED BUT NOT SCHEDULED  Eye Exam: ORDERED BUT NOT SCHEDULED  Colon Cancer Screening: Met on 1/6/2022  Last Blood Pressure <= 139/89 (5/31/2024): Yes  Last HbA1c < 8 (02/20/2024): NO      Schedule your breast cancer screening today     Breast cancer is the second most common cancer in women,  and the second leading cause of death from cancer. Mammograms can detect breast cancer early, which significantly increases the chances of curing the cancer.       Our records indicate that you may be overdue for breast cancer screening. Cancer screenings save lives, so schedule yours today to stay healthy.     If you recently had a mammogram performed outside of [facility], please let your Health care team know so that they can update your health record.        Lets manage your A1c levels     Your last hemoglobin A1c was higher than the goal of less than 8. Hemoglobin A1c or HbA1c is a blood test that measures your average blood sugar levels over the past 3 months. It is the main test to help you and your health care team manage your diabetes.     Higher A1c levels are linked to diabetes complications, such as loss of vision, kidney disease, and nerve damage. Keeping your A1c at least less than 8 is important to stay healthy and we are here to help. Talk with your provider on how you can further improve your A1c.     We recommend that you set up blood work to get a repeat hemoglobin A1c in 3 months to monitor your diabetes. Let your health care team know if you have questions.    Your diabetic retinal eye exam is due     Diabetes is the #1 cause of blindness in  the [place] - early detection before signs or symptoms develop can prevent debilitating blindness.     Our records indicate that you may be overdue for your annual diabetic eye exam. Eye screening can help identify patients at risk for developing vision loss which is common in diabetes. This simple screening is an important step to keeping you healthy and preventing complications from diabetes.     This recommended diabetic eye exam should take place once per year and can prevent and treat diabetes complications in the eye before developing symptoms. This can be done with a special camera is used to take photographs of the back of your eye without having to dilate them, or you can see an eye doctor for a full dilated exam.     If you recently had your yearly diabetic eye exam performed outside of [facility], please let your Health care team know so that they can update your health record.        Were here to help you quit smoking     Our records indicated that you are still smoking. One of the best things you can do for your health is to stop smoking and we are here to help.     Talk with your provider about our Smoking Cessation Program and how we can support you on your journey.

## 2024-06-13 ENCOUNTER — TELEPHONE (OUTPATIENT)
Dept: FAMILY MEDICINE | Facility: CLINIC | Age: 54
End: 2024-06-13
Payer: MEDICARE

## 2024-06-13 DIAGNOSIS — E11.65 TYPE 2 DIABETES MELLITUS WITH HYPERGLYCEMIA, WITH LONG-TERM CURRENT USE OF INSULIN: ICD-10-CM

## 2024-06-13 DIAGNOSIS — Z79.4 TYPE 2 DIABETES MELLITUS WITH HYPERGLYCEMIA, WITH LONG-TERM CURRENT USE OF INSULIN: ICD-10-CM

## 2024-06-13 DIAGNOSIS — E66.01 CLASS 3 OBESITY: Primary | ICD-10-CM

## 2024-06-13 NOTE — TELEPHONE ENCOUNTER
----- Message from Laura Moe sent at 6/13/2024 11:32 AM CDT -----  Contact: SELF  Type: RX Refill Request        Who Called: Patient   Refill or New Rx: refill  RX Name and Strength: Monjauro   How is the patient currently taking it? (ex. 1XDay): as directed   Is this a 30 day or 90 day RX: n/a  Preferred Pharmacy with phone number:   [facility] - [facility] [address]  [address] [address]  Phone: [phone] Fax: [phone]  Local or Mail Order: mail order   Ordering Provider: Dr. Gill Leigh Call Back Number: [phone]  Additional Information: Plz refill for the pt. Thanks

## 2024-06-14 ENCOUNTER — PATIENT OUTREACH (OUTPATIENT)
Dept: ADMINISTRATIVE | Facility: HOSPITAL | Age: 54
End: 2024-06-14
Payer: MEDICARE

## 2024-06-25 ENCOUNTER — PATIENT OUTREACH (OUTPATIENT)
Dept: ADMINISTRATIVE | Facility: HOSPITAL | Age: 54
End: 2024-06-25
Payer: MEDICARE

## 2024-06-25 NOTE — PROGRESS NOTES
Population Health Chart Review & Patient Outreach Details      Additional [facility] Health Notes:               Updates Requested / Reviewed:      Updated Care Coordination Note, Care Everywhere, and Immunizations Reconciliation Completed or Queried: [facility] Topics Overdue:      [facility] Score: 3     Hemoglobin A1c  Mammogram  LDCT Lung Screen                       Health Maintenance Topic(s) Outreach Outcomes & Actions Taken:    Eye Exam - Outreach Outcomes & Actions Taken  : Diabetic Eye External Records Uploaded, Care Team & History Updated if Applicable

## 2024-07-09 ENCOUNTER — HOSPITAL ENCOUNTER (INPATIENT)
Facility: HOSPITAL | Age: 54
LOS: 1 days | Discharge: HOME | DRG: 918 | End: 2024-07-11
Attending: STUDENT IN AN ORGANIZED HEALTH CARE EDUCATION/TRAINING PROGRAM | Admitting: STUDENT IN AN ORGANIZED HEALTH CARE EDUCATION/TRAINING PROGRAM
Payer: MEDICARE

## 2024-07-09 DIAGNOSIS — T63.441A ANAPHYLAXIS DUE TO HONEY BEE VENOM: Primary | ICD-10-CM

## 2024-07-09 DIAGNOSIS — R06.02 SHORTNESS OF BREATH: ICD-10-CM

## 2024-07-09 DIAGNOSIS — J44.1 COPD EXACERBATION: ICD-10-CM

## 2024-07-09 DIAGNOSIS — R07.9 CHEST PAIN: ICD-10-CM

## 2024-07-09 PROBLEM — Z79.4 INSULIN DEPENDENT TYPE 2 DIABETES MELLITUS: Status: ACTIVE | Noted: 2024-07-09

## 2024-07-09 PROBLEM — E11.9 INSULIN DEPENDENT TYPE 2 DIABETES MELLITUS: Status: ACTIVE | Noted: 2024-07-09

## 2024-07-09 PROBLEM — T78.2XXA ANAPHYLAXIS: Status: ACTIVE | Noted: 2024-07-09

## 2024-07-09 PROBLEM — Z72.0 TOBACCO ABUSE: Status: ACTIVE | Noted: 2024-07-09

## 2024-07-09 LAB
ALBUMIN SERPL BCP-MCNC: 3.5 G/DL (ref 3.5–5.2)
ALLENS TEST: ABNORMAL
ALLENS TEST: ABNORMAL
ALP SERPL-CCNC: 93 U/L (ref 55–135)
ALT SERPL W/O P-5'-P-CCNC: 12 U/L (ref 10–44)
ANION GAP SERPL CALC-SCNC: 16 MMOL/L (ref 8–16)
AST SERPL-CCNC: 12 U/L (ref 10–40)
BASOPHILS # BLD AUTO: 0.07 K/UL (ref 0–0.2)
BASOPHILS NFR BLD: 0.6 % (ref 0–1.9)
BILIRUB SERPL-MCNC: 0.6 MG/DL (ref 0.1–1)
BILIRUB UR QL STRIP: NEGATIVE
BNP SERPL-MCNC: <10 PG/ML (ref 0–99)
BUN SERPL-MCNC: 15 MG/DL (ref 6–20)
CALCIUM SERPL-MCNC: 9.4 MG/DL (ref 8.7–10.5)
CHLORIDE SERPL-SCNC: 108 MMOL/L (ref 95–110)
CLARITY UR: CLEAR
CO2 SERPL-SCNC: 16 MMOL/L (ref 23–29)
COLOR UR: YELLOW
CREAT SERPL-MCNC: 0.8 MG/DL (ref 0.5–1.4)
DELSYS: ABNORMAL
DELSYS: ABNORMAL
DIFFERENTIAL METHOD BLD: NORMAL
EOSINOPHIL # BLD AUTO: 0.3 K/UL (ref 0–0.5)
EOSINOPHIL NFR BLD: 2.3 % (ref 0–8)
EP: 5
ERYTHROCYTE [DISTWIDTH] IN BLOOD BY AUTOMATED COUNT: 14.2 % (ref 11.5–14.5)
ERYTHROCYTE [SEDIMENTATION RATE] IN BLOOD BY WESTERGREN METHOD: 12 MM/H
EST. GFR  (NO RACE VARIABLE): >60 ML/MIN/1.73 M^2
FIO2: 21
GLUCOSE SERPL-MCNC: 151 MG/DL (ref 70–110)
GLUCOSE UR QL STRIP: ABNORMAL
HCO3 UR-SCNC: 17.9 MMOL/L (ref 24–28)
HCO3 UR-SCNC: 18.1 MMOL/L (ref 24–28)
HCT VFR BLD AUTO: 44.2 % (ref 37–48.5)
HGB BLD-MCNC: 14.9 G/DL (ref 12–16)
HGB UR QL STRIP: NEGATIVE
IMM GRANULOCYTES # BLD AUTO: 0.04 K/UL (ref 0–0.04)
IMM GRANULOCYTES NFR BLD AUTO: 0.3 % (ref 0–0.5)
INFLUENZA A, MOLECULAR: NEGATIVE
INFLUENZA B, MOLECULAR: NEGATIVE
IP: 10
KETONES UR QL STRIP: ABNORMAL
LEUKOCYTE ESTERASE UR QL STRIP: NEGATIVE
LYMPHOCYTES # BLD AUTO: 4.2 K/UL (ref 1–4.8)
LYMPHOCYTES NFR BLD: 33.3 % (ref 18–48)
MCH RBC QN AUTO: 29.4 PG (ref 27–31)
MCHC RBC AUTO-ENTMCNC: 33.7 G/DL (ref 32–36)
MCV RBC AUTO: 87 FL (ref 82–98)
MIN VOL: 28.1
MODE: ABNORMAL
MONOCYTES # BLD AUTO: 0.7 K/UL (ref 0.3–1)
MONOCYTES NFR BLD: 5.9 % (ref 4–15)
NEUTROPHILS # BLD AUTO: 7.2 K/UL (ref 1.8–7.7)
NEUTROPHILS NFR BLD: 57.6 % (ref 38–73)
NITRITE UR QL STRIP: NEGATIVE
NRBC BLD-RTO: 0 /100 WBC
OHS QRS DURATION: 108 MS
OHS QTC CALCULATION: 450 MS
PCO2 BLDA: 17 MMHG (ref 35–45)
PCO2 BLDA: 22.6 MMHG (ref 35–45)
PH SMN: 7.51 [PH] (ref 7.35–7.45)
PH SMN: 7.63 [PH] (ref 7.35–7.45)
PH UR STRIP: 7 [PH] (ref 5–8)
PLATELET # BLD AUTO: 237 K/UL (ref 150–450)
PMV BLD AUTO: 11.6 FL (ref 9.2–12.9)
PO2 BLDA: 80 MMHG (ref 80–100)
PO2 BLDA: 91 MMHG (ref 80–100)
POC BE: -3 MMOL/L
POC BE: -5 MMOL/L
POC SATURATED O2: 98 % (ref 95–100)
POC SATURATED O2: 98 % (ref 95–100)
POCT GLUCOSE: 142 MG/DL (ref 70–110)
POCT GLUCOSE: 217 MG/DL (ref 70–110)
POTASSIUM SERPL-SCNC: 4.2 MMOL/L (ref 3.5–5.1)
PROT SERPL-MCNC: 6.4 G/DL (ref 6–8.4)
PROT UR QL STRIP: NEGATIVE
PROVIDER CREDENTIALS: ABNORMAL
PROVIDER CREDENTIALS: ABNORMAL
RBC # BLD AUTO: 5.07 M/UL (ref 4–5.4)
SAMPLE: ABNORMAL
SAMPLE: ABNORMAL
SARS-COV-2 RDRP RESP QL NAA+PROBE: NEGATIVE
SITE: ABNORMAL
SITE: ABNORMAL
SODIUM SERPL-SCNC: 140 MMOL/L (ref 136–145)
SP GR UR STRIP: 1.02 (ref 1–1.03)
SP02: 100
SPECIMEN SOURCE: NORMAL
SPONT RATE: 26
TROPONIN I SERPL DL<=0.01 NG/ML-MCNC: <0.006 NG/ML (ref 0–0.03)
URN SPEC COLLECT METH UR: ABNORMAL
UROBILINOGEN UR STRIP-ACNC: 1 EU/DL
WBC # BLD AUTO: 12.45 K/UL (ref 3.9–12.7)

## 2024-07-09 PROCEDURE — 84484 ASSAY OF TROPONIN QUANT: CPT | Performed by: STUDENT IN AN ORGANIZED HEALTH CARE EDUCATION/TRAINING PROGRAM

## 2024-07-09 PROCEDURE — 96376 TX/PRO/DX INJ SAME DRUG ADON: CPT

## 2024-07-09 PROCEDURE — 99291 CRITICAL CARE FIRST HOUR: CPT

## 2024-07-09 PROCEDURE — 94760 N-INVAS EAR/PLS OXIMETRY 1: CPT

## 2024-07-09 PROCEDURE — 25000003 PHARM REV CODE 250: Performed by: STUDENT IN AN ORGANIZED HEALTH CARE EDUCATION/TRAINING PROGRAM

## 2024-07-09 PROCEDURE — 63600175 PHARM REV CODE 636 W HCPCS: Performed by: STUDENT IN AN ORGANIZED HEALTH CARE EDUCATION/TRAINING PROGRAM

## 2024-07-09 PROCEDURE — 5A09357 ASSISTANCE WITH RESPIRATORY VENTILATION, LESS THAN 24 CONSECUTIVE HOURS, CONTINUOUS POSITIVE AIRWAY PRESSURE: ICD-10-PCS | Performed by: STUDENT IN AN ORGANIZED HEALTH CARE EDUCATION/TRAINING PROGRAM

## 2024-07-09 PROCEDURE — 71045 X-RAY EXAM CHEST 1 VIEW: CPT | Mod: 26,,, | Performed by: RADIOLOGY

## 2024-07-09 PROCEDURE — U0002 COVID-19 LAB TEST NON-CDC: HCPCS | Performed by: STUDENT IN AN ORGANIZED HEALTH CARE EDUCATION/TRAINING PROGRAM

## 2024-07-09 PROCEDURE — 93010 ELECTROCARDIOGRAM REPORT: CPT | Mod: ,,, | Performed by: INTERNAL MEDICINE

## 2024-07-09 PROCEDURE — 99900031 HC PATIENT EDUCATION (STAT)

## 2024-07-09 PROCEDURE — 99900035 HC TECH TIME PER 15 MIN (STAT)

## 2024-07-09 PROCEDURE — 81003 URINALYSIS AUTO W/O SCOPE: CPT | Performed by: STUDENT IN AN ORGANIZED HEALTH CARE EDUCATION/TRAINING PROGRAM

## 2024-07-09 PROCEDURE — 96375 TX/PRO/DX INJ NEW DRUG ADDON: CPT

## 2024-07-09 PROCEDURE — 36600 WITHDRAWAL OF ARTERIAL BLOOD: CPT

## 2024-07-09 PROCEDURE — G0378 HOSPITAL OBSERVATION PER HR: HCPCS

## 2024-07-09 PROCEDURE — 94640 AIRWAY INHALATION TREATMENT: CPT

## 2024-07-09 PROCEDURE — 82803 BLOOD GASES ANY COMBINATION: CPT

## 2024-07-09 PROCEDURE — 71045 X-RAY EXAM CHEST 1 VIEW: CPT | Mod: TC

## 2024-07-09 PROCEDURE — 85025 COMPLETE CBC W/AUTO DIFF WBC: CPT | Performed by: STUDENT IN AN ORGANIZED HEALTH CARE EDUCATION/TRAINING PROGRAM

## 2024-07-09 PROCEDURE — 96361 HYDRATE IV INFUSION ADD-ON: CPT

## 2024-07-09 PROCEDURE — 94761 N-INVAS EAR/PLS OXIMETRY MLT: CPT | Mod: XB

## 2024-07-09 PROCEDURE — 96365 THER/PROPH/DIAG IV INF INIT: CPT

## 2024-07-09 PROCEDURE — 27000190 HC CPAP FULL FACE MASK W/VALVE

## 2024-07-09 PROCEDURE — 96372 THER/PROPH/DIAG INJ SC/IM: CPT | Performed by: STUDENT IN AN ORGANIZED HEALTH CARE EDUCATION/TRAINING PROGRAM

## 2024-07-09 PROCEDURE — 99223 1ST HOSP IP/OBS HIGH 75: CPT | Mod: AI,,, | Performed by: STUDENT IN AN ORGANIZED HEALTH CARE EDUCATION/TRAINING PROGRAM

## 2024-07-09 PROCEDURE — 87502 INFLUENZA DNA AMP PROBE: CPT | Performed by: STUDENT IN AN ORGANIZED HEALTH CARE EDUCATION/TRAINING PROGRAM

## 2024-07-09 PROCEDURE — 99406 BEHAV CHNG SMOKING 3-10 MIN: CPT

## 2024-07-09 PROCEDURE — 83880 ASSAY OF NATRIURETIC PEPTIDE: CPT | Performed by: STUDENT IN AN ORGANIZED HEALTH CARE EDUCATION/TRAINING PROGRAM

## 2024-07-09 PROCEDURE — 80053 COMPREHEN METABOLIC PANEL: CPT | Performed by: STUDENT IN AN ORGANIZED HEALTH CARE EDUCATION/TRAINING PROGRAM

## 2024-07-09 PROCEDURE — 27000221 HC OXYGEN, UP TO 24 HOURS

## 2024-07-09 PROCEDURE — 25000242 PHARM REV CODE 250 ALT 637 W/ HCPCS: Performed by: STUDENT IN AN ORGANIZED HEALTH CARE EDUCATION/TRAINING PROGRAM

## 2024-07-09 PROCEDURE — 94660 CPAP INITIATION&MGMT: CPT

## 2024-07-09 PROCEDURE — 96366 THER/PROPH/DIAG IV INF ADDON: CPT

## 2024-07-09 PROCEDURE — 94640 AIRWAY INHALATION TREATMENT: CPT | Mod: XB

## 2024-07-09 PROCEDURE — 93005 ELECTROCARDIOGRAM TRACING: CPT

## 2024-07-09 RX ORDER — IBUPROFEN 200 MG
24 TABLET ORAL
Status: DISCONTINUED | OUTPATIENT
Start: 2024-07-09 | End: 2024-07-11 | Stop reason: HOSPADM

## 2024-07-09 RX ORDER — IPRATROPIUM BROMIDE AND ALBUTEROL SULFATE 2.5; .5 MG/3ML; MG/3ML
3 SOLUTION RESPIRATORY (INHALATION) EVERY 6 HOURS
Status: DISCONTINUED | OUTPATIENT
Start: 2024-07-09 | End: 2024-07-11 | Stop reason: HOSPADM

## 2024-07-09 RX ORDER — SODIUM CHLORIDE, SODIUM LACTATE, POTASSIUM CHLORIDE, CALCIUM CHLORIDE 600; 310; 30; 20 MG/100ML; MG/100ML; MG/100ML; MG/100ML
INJECTION, SOLUTION INTRAVENOUS CONTINUOUS
Status: DISCONTINUED | OUTPATIENT
Start: 2024-07-09 | End: 2024-07-10

## 2024-07-09 RX ORDER — GLUCAGON 1 MG
1 KIT INJECTION
Status: DISCONTINUED | OUTPATIENT
Start: 2024-07-09 | End: 2024-07-11 | Stop reason: HOSPADM

## 2024-07-09 RX ORDER — FAMOTIDINE 10 MG/ML
20 INJECTION INTRAVENOUS
Status: COMPLETED | OUTPATIENT
Start: 2024-07-09 | End: 2024-07-09

## 2024-07-09 RX ORDER — ENOXAPARIN SODIUM 100 MG/ML
40 INJECTION SUBCUTANEOUS EVERY 24 HOURS
Status: DISCONTINUED | OUTPATIENT
Start: 2024-07-10 | End: 2024-07-11 | Stop reason: HOSPADM

## 2024-07-09 RX ORDER — IBUPROFEN 200 MG
16 TABLET ORAL
Status: DISCONTINUED | OUTPATIENT
Start: 2024-07-09 | End: 2024-07-11 | Stop reason: HOSPADM

## 2024-07-09 RX ORDER — DIPHENHYDRAMINE HYDROCHLORIDE 50 MG/ML
12.5 INJECTION INTRAMUSCULAR; INTRAVENOUS
Status: COMPLETED | OUTPATIENT
Start: 2024-07-09 | End: 2024-07-09

## 2024-07-09 RX ORDER — EPINEPHRINE 0.15 MG/.3ML
0.15 INJECTION INTRAMUSCULAR
Status: DISCONTINUED | OUTPATIENT
Start: 2024-07-09 | End: 2024-07-09

## 2024-07-09 RX ORDER — ONDANSETRON HYDROCHLORIDE 2 MG/ML
4 INJECTION, SOLUTION INTRAVENOUS EVERY 8 HOURS PRN
Status: DISCONTINUED | OUTPATIENT
Start: 2024-07-09 | End: 2024-07-11 | Stop reason: HOSPADM

## 2024-07-09 RX ORDER — DEXAMETHASONE SODIUM PHOSPHATE 4 MG/ML
8 INJECTION, SOLUTION INTRA-ARTICULAR; INTRALESIONAL; INTRAMUSCULAR; INTRAVENOUS; SOFT TISSUE EVERY 8 HOURS
Status: DISCONTINUED | OUTPATIENT
Start: 2024-07-09 | End: 2024-07-11 | Stop reason: HOSPADM

## 2024-07-09 RX ORDER — MAGNESIUM SULFATE HEPTAHYDRATE 40 MG/ML
2 INJECTION, SOLUTION INTRAVENOUS
Status: COMPLETED | OUTPATIENT
Start: 2024-07-09 | End: 2024-07-09

## 2024-07-09 RX ORDER — NALOXONE HCL 0.4 MG/ML
0.02 VIAL (ML) INJECTION
Status: DISCONTINUED | OUTPATIENT
Start: 2024-07-09 | End: 2024-07-11 | Stop reason: HOSPADM

## 2024-07-09 RX ORDER — EPINEPHRINE 0.3 MG/.3ML
0.3 INJECTION SUBCUTANEOUS
Status: COMPLETED | OUTPATIENT
Start: 2024-07-09 | End: 2024-07-09

## 2024-07-09 RX ORDER — INSULIN ASPART 100 [IU]/ML
0-5 INJECTION, SOLUTION INTRAVENOUS; SUBCUTANEOUS
Status: DISCONTINUED | OUTPATIENT
Start: 2024-07-09 | End: 2024-07-11 | Stop reason: HOSPADM

## 2024-07-09 RX ORDER — FAMOTIDINE 10 MG/ML
INJECTION INTRAVENOUS 2 TIMES DAILY
Status: DISCONTINUED | OUTPATIENT
Start: 2024-07-09 | End: 2024-07-11 | Stop reason: HOSPADM

## 2024-07-09 RX ORDER — IPRATROPIUM BROMIDE AND ALBUTEROL SULFATE 2.5; .5 MG/3ML; MG/3ML
3 SOLUTION RESPIRATORY (INHALATION)
Status: COMPLETED | OUTPATIENT
Start: 2024-07-09 | End: 2024-07-09

## 2024-07-09 RX ORDER — HYDROMORPHONE HYDROCHLORIDE 1 MG/ML
0.5 INJECTION, SOLUTION INTRAMUSCULAR; INTRAVENOUS; SUBCUTANEOUS EVERY 6 HOURS PRN
Status: DISCONTINUED | OUTPATIENT
Start: 2024-07-09 | End: 2024-07-10

## 2024-07-09 RX ORDER — HYDROMORPHONE HYDROCHLORIDE 1 MG/ML
0.5 INJECTION, SOLUTION INTRAMUSCULAR; INTRAVENOUS; SUBCUTANEOUS EVERY 8 HOURS PRN
Status: DISCONTINUED | OUTPATIENT
Start: 2024-07-09 | End: 2024-07-09

## 2024-07-09 RX ORDER — SODIUM CHLORIDE 0.9 % (FLUSH) 0.9 %
10 SYRINGE (ML) INJECTION EVERY 12 HOURS PRN
Status: DISCONTINUED | OUTPATIENT
Start: 2024-07-09 | End: 2024-07-11 | Stop reason: HOSPADM

## 2024-07-09 RX ORDER — INSULIN GLARGINE 100 [IU]/ML
10 INJECTION, SOLUTION SUBCUTANEOUS DAILY
Status: DISCONTINUED | OUTPATIENT
Start: 2024-07-10 | End: 2024-07-11 | Stop reason: HOSPADM

## 2024-07-09 RX ORDER — DEXAMETHASONE SODIUM PHOSPHATE 100 MG/10ML
10 INJECTION INTRAMUSCULAR; INTRAVENOUS
Status: COMPLETED | OUTPATIENT
Start: 2024-07-09 | End: 2024-07-09

## 2024-07-09 RX ORDER — DIPHENHYDRAMINE HYDROCHLORIDE 50 MG/ML
25 INJECTION INTRAMUSCULAR; INTRAVENOUS EVERY 8 HOURS
Status: DISCONTINUED | OUTPATIENT
Start: 2024-07-09 | End: 2024-07-11 | Stop reason: HOSPADM

## 2024-07-09 RX ADMIN — IPRATROPIUM BROMIDE AND ALBUTEROL SULFATE 3 ML: .5; 2.5 SOLUTION RESPIRATORY (INHALATION) at 09:07

## 2024-07-09 RX ADMIN — MAGNESIUM SULFATE IN WATER 2 G: 40 INJECTION, SOLUTION INTRAVENOUS at 10:07

## 2024-07-09 RX ADMIN — DIPHENHYDRAMINE HYDROCHLORIDE 25 MG: 50 INJECTION, SOLUTION INTRAMUSCULAR; INTRAVENOUS at 05:07

## 2024-07-09 RX ADMIN — DIPHENHYDRAMINE HYDROCHLORIDE 12.5 MG: 50 INJECTION INTRAMUSCULAR; INTRAVENOUS at 10:07

## 2024-07-09 RX ADMIN — EPINEPHRINE 0.3 MG: 0.3 INJECTION INTRAMUSCULAR at 09:07

## 2024-07-09 RX ADMIN — HYDROMORPHONE HYDROCHLORIDE 0.5 MG: 1 INJECTION, SOLUTION INTRAMUSCULAR; INTRAVENOUS; SUBCUTANEOUS at 11:07

## 2024-07-09 RX ADMIN — SODIUM CHLORIDE, POTASSIUM CHLORIDE, SODIUM LACTATE AND CALCIUM CHLORIDE: 600; 310; 30; 20 INJECTION, SOLUTION INTRAVENOUS at 05:07

## 2024-07-09 RX ADMIN — DEXAMETHASONE SODIUM PHOSPHATE 8 MG: 4 INJECTION, SOLUTION INTRA-ARTICULAR; INTRALESIONAL; INTRAMUSCULAR; INTRAVENOUS; SOFT TISSUE at 05:07

## 2024-07-09 RX ADMIN — FAMOTIDINE 20 MG: 10 INJECTION, SOLUTION INTRAVENOUS at 09:07

## 2024-07-09 RX ADMIN — IPRATROPIUM BROMIDE AND ALBUTEROL SULFATE 3 ML: .5; 2.5 SOLUTION RESPIRATORY (INHALATION) at 07:07

## 2024-07-09 RX ADMIN — FAMOTIDINE 20 MG: 10 INJECTION, SOLUTION INTRAVENOUS at 10:07

## 2024-07-09 RX ADMIN — HYDROMORPHONE HYDROCHLORIDE 0.5 MG: 1 INJECTION, SOLUTION INTRAMUSCULAR; INTRAVENOUS; SUBCUTANEOUS at 05:07

## 2024-07-09 RX ADMIN — SODIUM CHLORIDE 1000 ML: 9 INJECTION, SOLUTION INTRAVENOUS at 01:07

## 2024-07-09 RX ADMIN — DEXAMETHASONE SODIUM PHOSPHATE 10 MG: 10 INJECTION INTRAMUSCULAR; INTRAVENOUS at 10:07

## 2024-07-09 NOTE — CARE UPDATE
07/09/24 0957   Patient Assessment/Suction   Level of Consciousness (AVPU) alert   Respiratory Effort Mild   Expansion/Accessory Muscles/Retractions no use of accessory muscles;no retractions;expansion symmetric   All Lung Fields Breath Sounds Anterior:;diminished   PRE-TX-O2   Device (Oxygen Therapy) nasal cannula   $ Is the patient on Low Flow Oxygen? Yes   Flow (L/min) (Oxygen Therapy) 2   SpO2 99 %   Pulse Oximetry Type Continuous   $ Pulse Oximetry - Single Charge Pulse Oximetry - Single   Pulse 84   Resp 19   Positioning HOB elevated 45 degrees   Positioning   Head of Bed (HOB) Positioning HOB at 60 degrees   Aerosol Therapy   $ Aerosol Therapy Charges Aerosol Treatment   Respiratory Treatment Status (SVN) given   Treatment Route (SVN) mask;oxygen   Patient Position HOB elevated   Post Treatment Assessment (SVN) increased aeration   Signs of Intolerance (SVN) none   Breath Sounds Post-Respiratory Treatment   Throughout All Fields Post-Treatment All Fields   Throughout All Fields Post-Treatment diminished;aeration increased   Post-treatment Heart Rate (beats/min) 84   Post-treatment Resp Rate (breaths/min) 19   Education   $ Education Bronchodilator

## 2024-07-09 NOTE — ASSESSMENT & PLAN NOTE
Respiratory distress on arrival and placed on bipap  Given epi, benadryl, steroids, famotidine with improvement  No Stridor or wheezing one exam  Continue steroids, famotidine, benadryl, duonebs  Continue IVF  Q4h neurovascular checks to injection site for IM epi because patient having pain in leg. Pulses present distally with doppler

## 2024-07-09 NOTE — ED PROVIDER NOTES
"Encounter Date: 7/9/2024       History     Chief Complaint   Patient presents with    Shortness of Breath     Pt. States she is allergic to bees and was stung PTA. Pt. Now c/o SOB. Audible wheezing noted. Hx. Of COPD     54-year-old female with significant history of diabetes, COPD not home O2, active tobacco smoker, allergy to bee sting.  She presents to ED chief complaints of shortness of breath, wheezing with onset just after a bee stung just prior to arrival. Unable to speak in full sentences with about audible wheeze upon initial arrival to the ED. Her uvula is swollen and hoarseness when she attempts a speak otherwise no facial swelling, lip swelling, or rash noted. No GI symptoms.     The history is provided by the patient. No  was used.     Review of patient's allergies indicates:   Allergen Reactions    Glucosamine Swelling     Reports allergy to seafood made her throat swell    Guaifenesin Swelling    Hyoscyamine sulfate Rash and Swelling    Sulfa (sulfonamide antibiotics) Swelling and Rash    Aspirin     Aspirin,buffd-calcium carb-mag Other (See Comments)     gi bleed    Bactrim [sulfamethoxazole-trimethoprim]     Chlorquinaldol     Clarithromycin Other (See Comments)     Causes yeast infection    Clozaril [clozapine]     Compazine [prochlorperazine edisylate]     Haldol [haloperidol lactate]     Imitrex [sumatriptan succinate]     Keflex [cephalexin]     Latex Other (See Comments)     Pt says "puss started oozing from my hands after wearing gloves."    Liraglutide Hives    Medrol [methylprednisolone]     Penicillins     Doxycycline Rash    Egg derived Diarrhea    Hydroxyzine Rash     Received PO hydroxyzine 3/11/23 w/ no issues    Levofloxacin Nausea And Vomiting and Rash    Morphine Itching and Nausea And Vomiting    Nitrofurantoin Rash     Past Medical History:   Diagnosis Date    Arthritis     Diabetes     Gall bladder disease     Hypertension     Liver disease     Lung disease     " Neuropathy     Psychiatric illness     Reflux esophagitis      Past Surgical History:   Procedure Laterality Date     SECTION      KNEE SURGERY       No family history on file.  Social History     Tobacco Use    Smoking status: Every Day     Current packs/day: 2.00     Average packs/day: 2.0 packs/day for 38.5 years (77.0 ttl pk-yrs)     Types: Cigarettes     Start date:    Substance Use Topics    Alcohol use: Not Currently    Drug use: Not Currently     Review of Systems   Constitutional: Negative.    HENT: Negative.     Eyes: Negative.    Respiratory:  Positive for shortness of breath and wheezing.    Gastrointestinal:  Positive for diarrhea, nausea and vomiting.   Endocrine: Negative.    Genitourinary: Negative.    Musculoskeletal: Negative.    Skin: Negative.    Allergic/Immunologic: Negative.    Neurological: Negative.    Hematological: Negative.    Psychiatric/Behavioral:  The patient is nervous/anxious.    All other systems reviewed and are negative.      Physical Exam     Initial Vitals [24 0947]   BP Pulse Resp Temp SpO2   102/75 85 (!) 22 98.3 °F (36.8 °C) 100 %      MAP       --         Physical Exam    Nursing note and vitals reviewed.  Constitutional: She appears well-developed.   Morbidly obese, severe anxiety   HENT:   Head: Normocephalic.   Swollen, elongated uvula   Neck: No JVD present.   Normal range of motion.  Pulmonary/Chest: She is in respiratory distress (Tachypneic). She has wheezes.   Abdominal: Abdomen is soft. There is no abdominal tenderness.   Obese abdomen   Musculoskeletal:         General: Normal range of motion.      Cervical back: Normal range of motion.     Lymphadenopathy:     She has no cervical adenopathy.   Neurological: She is alert and oriented to person, place, and time. She has normal strength. GCS score is 15. GCS eye subscore is 4. GCS verbal subscore is 5. GCS motor subscore is 6.   Skin: Skin is warm. Capillary refill takes less than 2  seconds. No rash noted. No erythema. No pallor.   Psychiatric: She has a normal mood and affect.         ED Course   Critical Care    Date/Time: 7/9/2024 1:55 PM    Performed by: Colt Ignacio MD  Authorized by: Colt Ignacio MD  Direct patient critical care time: 20 minutes  Additional history critical care time: 5 minutes  Ordering / reviewing critical care time: 5 minutes  Documentation critical care time: 2 minutes  Consulting other physicians critical care time: 1 minutes  Other critical care time: 10 minutes  Total critical care time (exclusive of procedural time) : 43 minutes  Critical care time was exclusive of separately billable procedures and treating other patients.  Critical care was necessary to treat or prevent imminent or life-threatening deterioration of the following conditions: respiratory failure.  Critical care was time spent personally by me on the following activities: blood draw for specimens, development of treatment plan with patient or surrogate, discussions with consultants, interpretation of cardiac output measurements, evaluation of patient's response to treatment, examination of patient, obtaining history from patient or surrogate, ordering and performing treatments and interventions, ordering and review of laboratory studies, ordering and review of radiographic studies, pulse oximetry and re-evaluation of patient's condition.        Labs Reviewed   COMPREHENSIVE METABOLIC PANEL - Abnormal; Notable for the following components:       Result Value    CO2 16 (*)     Glucose 151 (*)     All other components within normal limits    Narrative:     Recoll. [id number] by [facility] at 07/09/2024 11:09, reason: Specimen   hemolyzed   ISTAT PROCEDURE - Abnormal; Notable for the following components:    POC PH 7.630 (*)     POC PCO2 17.0 (*)     POC HCO3 17.9 (*)     POC BE -3 (*)     All other components within normal limits   ISTAT PROCEDURE - Abnormal; Notable for the following  components:    POC PH 7.513 (*)     POC PCO2 22.6 (*)     POC HCO3 18.1 (*)     POC BE -5 (*)     All other components within normal limits   INFLUENZA A & B BY MOLECULAR   CBC W/ AUTO DIFFERENTIAL   SARS-COV-2 RNA AMPLIFICATION, QUAL   B-TYPE NATRIURETIC PEPTIDE    Narrative:     Recoll. [id number] by [facility] at 07/09/2024 11:11, reason: Specimen   hemolyzed   TROPONIN I    Narrative:     Recoll. [id number] by [facility] at 07/09/2024 11:09, reason: Specimen   hemolyzed        ECG Results              EKG 12-lead (Final result)        Collection Time Result Time QRS Duration OHS QTC Calculation    07/09/24 10:25:48 07/09/24 13:12:29 108 450                     Final result by Interface, Lab In [facility] (07/09/24 13:12:33)                   Narrative:    Test Reason : R06.02,    Vent. Rate : 076 BPM     Atrial Rate : 076 BPM     P-R Int : 162 ms          QRS Dur : 108 ms      QT Int : 400 ms       P-R-T Axes : 034 -20 003 degrees     QTc Int : 450 ms    Normal sinus rhythm  Low voltage QRS  Septal infarct (cited on or before 05-MAY-2024)  Abnormal ECG  When compared with ECG of 05-MAY-2024 00:41,  The axis Shifted right  Confirmed by Diego Rios MD ([id number]) on 7/9/2024 1:12:27 PM    Referred By: [email or web address]   SELF           Confirmed By:Diego Rios MD                                  Imaging Results              X-Ray Chest AP Portable (Final result)  Result time 07/09/24 10:38:03      Final result by Lon Del Toro MD (07/09/24 10:38:03)                   Impression:      No acute chest disease.      Electronically signed by: Lon Del Toro  Date:    07/09/2024  Time:    10:38               Narrative:    EXAMINATION:  XR CHEST AP PORTABLE    CLINICAL HISTORY:  Shortness of breath    TECHNIQUE:  Portable view of the chest was performed.    COMPARISON:  03/09/2024.    FINDINGS:  Lungs are clear.  No focal consolidation.  Heart size normal.  Mediastinal contours unremarkable.  Trachea midline.    Bony thorax intact.  Right IJ  Port-A-Cath remains in satisfactory position.                                       Medications   sodium chloride 0.9% bolus 1,000 mL 1,000 mL (1,000 mLs Intravenous New Bag 7/9/24 1351)   albuterol-ipratropium 2.5 mg-0.5 mg/3 mL nebulizer solution 3 mL (3 mLs Nebulization Given 7/9/24 0955)   albuterol-ipratropium 2.5 mg-0.5 mg/3 mL nebulizer solution 3 mL (3 mLs Nebulization Given 7/9/24 0955)   famotidine (PF) injection 20 mg (20 mg Intravenous Given 7/9/24 1031)   EPINEPHrine (EPIPEN) 0.3 mg/0.3 mL pen injection 0.3 mg (0.3 mg Intramuscular Given 7/9/24 0955)   magnesium sulfate 2g in water 50mL IVPB (premix) (2 g Intravenous New Bag 7/9/24 1040)   dexAMETHasone injection 10 mg (10 mg Other Given 7/9/24 1032)   diphenhydrAMINE injection 12.5 mg (12.5 mg Intravenous Given 7/9/24 1034)     Medical Decision Making  54-year-old female with significant history of diabetes, COPD not home O2, active tobacco smoker, allergy to bee sting.  She presents to ED chief complaints of shortness of breath, wheezing with onset just after a bee stung just prior to arrival. Unable to speak in full sentences with about audible wheeze upon initial arrival to the ED. Her uvula is swollen and hoarseness when she attempts a speak otherwise no facial swelling, lip swelling, or rash noted. No GI symptoms.   ----------------------------------------------------------  Ddx COPD exacerbation, anaphylaxis, others  She is extremely anxious, hyperventilating  Epinephrine IM administered to left lateral thigh  She reports allergy (hives) to steroids, tolerated Decadron given with Benadryl just okay.  Also treated with DuoNeb breathing treatment x2 without much improvement,   ABG shows respiratory alkalosis patient was significantly placed on BiPAP.   Patient was re-evaluated, uvula swollen has resolved completely, she is doing much better on BiPAP although not back to baseline at this time.    Amount and/or Complexity of Data Reviewed  Labs:  ordered.  Radiology: ordered.    Risk  Prescription drug management.                                      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

## 2024-07-09 NOTE — PLAN OF CARE
Problem: Adult Inpatient Plan of Care  Goal: Plan of Care Review  Outcome: Progressing  Goal: Patient-Specific Goal (Individualized)  Outcome: Progressing  Goal: Absence of Hospital-Acquired Illness or Injury  Outcome: Progressing  Goal: Optimal Comfort and Wellbeing  Outcome: Progressing  Goal: Readiness for Transition of Care  Outcome: Progressing     Problem: Bariatric Environmental Safety  Goal: Safety Maintained with Care  Outcome: Progressing     Problem: Diabetes Comorbidity  Goal: Blood Glucose Level Within Targeted Range  Outcome: Progressing

## 2024-07-09 NOTE — SUBJECTIVE & OBJECTIVE
"Past Medical History:   Diagnosis Date    Arthritis     Diabetes     Gall bladder disease     Hypertension     Liver disease     Lung disease     Neuropathy     Psychiatric illness     Reflux esophagitis        Past Surgical History:   Procedure Laterality Date     SECTION      KNEE SURGERY         Review of patient's allergies indicates:   Allergen Reactions    Glucosamine Swelling     Reports allergy to seafood made her throat swell    Guaifenesin Swelling    Hyoscyamine sulfate Rash and Swelling    Sulfa (sulfonamide antibiotics) Swelling and Rash    Aspirin     Aspirin,buffd-calcium carb-mag Other (See Comments)     gi bleed    Bactrim [sulfamethoxazole-trimethoprim]     Chlorquinaldol     Clarithromycin Other (See Comments)     Causes yeast infection    Clozaril [clozapine]     Compazine [prochlorperazine edisylate]     Haldol [haloperidol lactate]     Imitrex [sumatriptan succinate]     Keflex [cephalexin]     Latex Other (See Comments)     Pt says "puss started oozing from my hands after wearing gloves."    Liraglutide Hives    Medrol [methylprednisolone]     Penicillins     Doxycycline Rash    Egg derived Diarrhea    Hydroxyzine Rash     Received PO hydroxyzine 3/11/23 w/ no issues    Levofloxacin Nausea And Vomiting and Rash    Morphine Itching and Nausea And Vomiting    Nitrofurantoin Rash       No current facility-administered medications on file prior to encounter.     Current Outpatient Medications on File Prior to Encounter   Medication Sig    albuterol-ipratropium (DUO-NEB) 2.5 mg-0.5 mg/3 mL nebulizer solution Take 3 mLs by nebulization every 6 (six) hours as needed for Wheezing. Rescue    amitriptyline (ELAVIL) 150 MG Tab Take 150 mg by mouth every evening.    aspirin (ECOTRIN) 81 MG EC tablet Take 81 mg by mouth once daily.    AUSTEDO XR TITRATION KT,WK1-4, 6 mg (14)-12 mg (14)-24 mg (14) T24d Take 6 mg by mouth once daily.    blood sugar diagnostic (TRUE METRIX GLUCOSE TEST STRIP " "MISC) Take 1 strip(s) 3 times a day by miscell. route for 30 days.    blood sugar diagnostic Strp To check BG 2 times daily, to use with insurance preferred meter    blood-glucose meter (TRUE METRIX GLUCOSE METER MISC) Use as directed.    blood-glucose meter kit To check BG 2 times daily, to use with insurance preferred meter    budesonide-glycopyr-formoterol (BREZTRI AEROSPHERE) 160-9-4.8 mcg/actuation HFAA Inhale 2 puffs into the lungs 2 (two) times daily.    docusate sodium (COLACE) 100 MG capsule Take 100 mg by mouth.    empagliflozin (JARDIANCE) 25 mg tablet Take 1 tablet (25 mg total) by mouth once daily.    fluticasone propionate (FLONASE) 50 mcg/actuation nasal spray 1 spray (50 mcg total) by Each Nostril route once daily.    gabapentin (NEURONTIN) 600 MG tablet Take 1 tablet (600 mg total) by mouth 3 (three) times daily.    ibuprofen (ADVIL,MOTRIN) 800 MG tablet Take 800 mg by mouth.    insulin glargine U-100, Lantus, (LANTUS SOLOSTAR U-100 INSULIN) 100 unit/mL (3 mL) InPn pen Inject 10 Units into the skin once daily.    insulin syringe-needle U-100 1 mL 31 gauge x 5/16 Syrg     lancets (THIN LANCETS) 26 gauge Misc USE 3 TO 4 TIMES DAILY    lancets [facility] To check BG 2 times daily, to use with insurance preferred meter    lisinopriL 10 MG tablet Take 1 tablet (10 mg total) by mouth once daily.    loratadine (CLARITIN) 10 mg tablet Take 1 tablet (10 mg total) by mouth daily as needed for Allergies.    metoprolol succinate (TOPROL-XL) 50 MG 24 hr tablet Take 1 tablet (50 mg total) by mouth once daily.    nebulizer and compressor Alena 1 each by Misc.(Non-Drug; Combo Route) route 2 (two) times a day.    omeprazole (PRILOSEC) 40 MG capsule Take 40 mg by mouth.    oxybutynin (DITROPAN XL) 15 MG TR24 Take 1 tablet (15 mg total) by mouth once daily.    pen needle, diabetic (BD ULTRA-FINE KENDALL PEN NEEDLE) 32 gauge x 5/32" Ndle     pen needle, diabetic (ULTICARE PEN NEEDLE) 31 gauge x 1/4" Ndle     pen needle, diabetic " "32 gauge x 1/4" Ndle     pravastatin (PRAVACHOL) 40 MG tablet Take 1 tablet (40 mg total) by mouth every evening.    QUEtiapine (SEROQUEL XR) 400 MG Tb24 Take 400 mg by mouth every evening.    tirzepatide 10 mg/0.5 mL PnIj Inject 10 mg into the skin every 7 days.    traZODone (DESYREL) 100 MG tablet Take 100 mg by mouth nightly as needed.    venlafaxine (EFFEXOR-XR) 150 MG Cp24 Take 150 mg by mouth.     Family History    None       Tobacco Use    Smoking status: Every Day     Current packs/day: 2.00     Average packs/day: 2.0 packs/day for 38.5 years (77.0 ttl pk-yrs)     Types: Cigarettes     Start date: 1986    Smokeless tobacco: Not on file   Substance and Sexual Activity    Alcohol use: Not Currently    Drug use: Not Currently    Sexual activity: Not Currently     Review of Systems   All other systems reviewed and are negative.    Objective:     Vital Signs (Most Recent):  Temp: 98.3 °F (36.8 °C) (07/09/24 0947)  Pulse: 73 (07/09/24 1047)  Resp: (!) 23 (07/09/24 1047)  BP: 102/75 (07/09/24 0947)  SpO2: 100 % (07/09/24 1047) Vital Signs (24h Range):  Temp:  [98.3 °F (36.8 °C)] 98.3 °F (36.8 °C)  Pulse:  [73-85] 73  Resp:  [19-25] 23  SpO2:  [99 %-100 %] 100 %  BP: (102)/(75) 102/75     Weight: 126.6 kg (279 lb)  Body mass index is 41.2 kg/m².     Physical Exam  Vitals reviewed.   Constitutional:       General: She is not in acute distress.     Appearance: Normal appearance.   HENT:      Head: Normocephalic and atraumatic.   Eyes:      Extraocular Movements: Extraocular movements intact.      Pupils: Pupils are equal, round, and reactive to light.   Cardiovascular:      Rate and Rhythm: Normal rate and regular rhythm.      Pulses: Normal pulses.   Pulmonary:      Effort: Pulmonary effort is normal. No respiratory distress.      Breath sounds: Normal breath sounds. No stridor. No wheezing, rhonchi or rales.   Abdominal:      Palpations: Abdomen is soft.      Tenderness: There is no abdominal tenderness. "   Musculoskeletal:      Right lower leg: No edema.      Left lower leg: No edema.   Skin:     General: Skin is warm and dry.   Neurological:      General: No focal deficit present.      Mental Status: She is alert and oriented to person, place, and time.   Psychiatric:         Mood and Affect: Mood normal.         Behavior: Behavior normal.              CRANIAL NERVES     CN III, IV, VI   Pupils are equal, round, and reactive to light.       Significant Labs: All pertinent labs within the past 24 hours have been reviewed.    Significant Imaging: I have reviewed all pertinent imaging results/findings within the past 24 hours.

## 2024-07-09 NOTE — H&P
[facility] Medicine  History & Physical    Patient Name: Sommer Guevara  MRN: [id number]  Patient Class: OP- Observation  Admission Date: 2024  Attending Physician: Edwar Grijalva MD   Primary Care Provider: Ivelisse Garland MD         Patient information was obtained from patient and ER records.     Subjective:     Principal Problem:Anaphylaxis    Chief Complaint:   Chief Complaint   Patient presents with    Shortness of Breath     Pt. States she is allergic to bees and was stung PTA. Pt. Now c/o SOB. Audible wheezing noted. Hx. Of COPD        HPI: Patient is a 53 yo w/ hx of HTN, IDDM2, COPD, Depression, Current tobacco abuse presenting with severe shortness of breath following bee sting. Patient with hx of anaphylaxis with bee stings. Does not have an epi pen. No NVD or rash. Patient did not take home medications today. No fever or chills. Breathing is improved at time of our conversation. She did have some swelling in uvula at presentation but per ED doctor this has completely resolved.     Past Medical History:   Diagnosis Date    Arthritis     Diabetes     Gall bladder disease     Hypertension     Liver disease     Lung disease     Neuropathy     Psychiatric illness     Reflux esophagitis        Past Surgical History:   Procedure Laterality Date     SECTION      KNEE SURGERY         Review of patient's allergies indicates:   Allergen Reactions    Glucosamine Swelling     Reports allergy to seafood made her throat swell    Guaifenesin Swelling    Hyoscyamine sulfate Rash and Swelling    Sulfa (sulfonamide antibiotics) Swelling and Rash    Aspirin     Aspirin,buffd-calcium carb-mag Other (See Comments)     gi bleed    Bactrim [sulfamethoxazole-trimethoprim]     Chlorquinaldol     Clarithromycin Other (See Comments)     Causes yeast infection    Clozaril [clozapine]     Compazine [prochlorperazine edisylate]     Haldol [haloperidol lactate]     Imitrex  "[sumatriptan succinate]     Keflex [cephalexin]     Latex Other (See Comments)     Pt says "puss started oozing from my hands after wearing gloves."    Liraglutide Hives    Medrol [methylprednisolone]     Penicillins     Doxycycline Rash    Egg derived Diarrhea    Hydroxyzine Rash     Received PO hydroxyzine 3/11/23 w/ no issues    Levofloxacin Nausea And Vomiting and Rash    Morphine Itching and Nausea And Vomiting    Nitrofurantoin Rash       No current facility-administered medications on file prior to encounter.     Current Outpatient Medications on File Prior to Encounter   Medication Sig    albuterol-ipratropium (DUO-NEB) 2.5 mg-0.5 mg/3 mL nebulizer solution Take 3 mLs by nebulization every 6 (six) hours as needed for Wheezing. Rescue    amitriptyline (ELAVIL) 150 MG Tab Take 150 mg by mouth every evening.    aspirin (ECOTRIN) 81 MG EC tablet Take 81 mg by mouth once daily.    AUSTEDO XR TITRATION KT,WK1-4, 6 mg (14)-12 mg (14)-24 mg (14) T24d Take 6 mg by mouth once daily.    blood sugar diagnostic (TRUE METRIX GLUCOSE TEST STRIP MISC) Take 1 strip(s) 3 times a day by miscell. route for 30 days.    blood sugar diagnostic Strp To check BG 2 times daily, to use with insurance preferred meter    blood-glucose meter (TRUE METRIX GLUCOSE METER MISC) Use as directed.    blood-glucose meter kit To check BG 2 times daily, to use with insurance preferred meter    budesonide-glycopyr-formoterol (BREZTRI AEROSPHERE) 160-9-4.8 mcg/actuation HFAA Inhale 2 puffs into the lungs 2 (two) times daily.    docusate sodium (COLACE) 100 MG capsule Take 100 mg by mouth.    empagliflozin (JARDIANCE) 25 mg tablet Take 1 tablet (25 mg total) by mouth once daily.    fluticasone propionate (FLONASE) 50 mcg/actuation nasal spray 1 spray (50 mcg total) by Each Nostril route once daily.    gabapentin (NEURONTIN) 600 MG tablet Take 1 tablet (600 mg total) by mouth 3 (three) times daily.    ibuprofen (ADVIL,MOTRIN) 800 MG tablet Take 800 mg " "by mouth.    insulin glargine U-100, Lantus, (LANTUS SOLOSTAR U-100 INSULIN) 100 unit/mL (3 mL) InPn pen Inject 10 Units into the skin once daily.    insulin syringe-needle U-100 1 mL 31 gauge x 5/16 Syrg     lancets (THIN LANCETS) 26 gauge Misc USE 3 TO 4 TIMES DAILY    lancets Misc To check BG 2 times daily, to use with insurance preferred meter    lisinopriL 10 MG tablet Take 1 tablet (10 mg total) by mouth once daily.    loratadine (CLARITIN) 10 mg tablet Take 1 tablet (10 mg total) by mouth daily as needed for Allergies.    metoprolol succinate (TOPROL-XL) 50 MG 24 hr tablet Take 1 tablet (50 mg total) by mouth once daily.    nebulizer and compressor Alena 1 each by Misc.(Non-Drug; Combo Route) route 2 (two) times a day.    omeprazole (PRILOSEC) 40 MG capsule Take 40 mg by mouth.    oxybutynin (DITROPAN XL) 15 MG TR24 Take 1 tablet (15 mg total) by mouth once daily.    pen needle, diabetic (BD ULTRA-FINE KENDALL PEN NEEDLE) 32 gauge x 5/32" Ndle     pen needle, diabetic (ULTICARE PEN NEEDLE) 31 gauge x 1/4" Ndle     pen needle, diabetic 32 gauge x 1/4" Ndle     pravastatin (PRAVACHOL) 40 MG tablet Take 1 tablet (40 mg total) by mouth every evening.    QUEtiapine (SEROQUEL XR) 400 MG Tb24 Take 400 mg by mouth every evening.    tirzepatide 10 mg/0.5 mL PnIj Inject 10 mg into the skin every 7 days.    traZODone (DESYREL) 100 MG tablet Take 100 mg by mouth nightly as needed.    venlafaxine (EFFEXOR-XR) 150 MG Cp24 Take 150 mg by mouth.     Family History    None       Tobacco Use    Smoking status: Every Day     Current packs/day: 2.00     Average packs/day: 2.0 packs/day for 38.5 years (77.0 ttl pk-yrs)     Types: Cigarettes     Start date: 1986    Smokeless tobacco: Not on file   Substance and Sexual Activity    Alcohol use: Not Currently    Drug use: Not Currently    Sexual activity: Not Currently     Review of Systems   All other systems reviewed and are negative.    Objective:     Vital Signs (Most Recent):  Temp: " 98.3 °F (36.8 °C) (07/09/24 0947)  Pulse: 73 (07/09/24 1047)  Resp: (!) 23 (07/09/24 1047)  BP: 102/75 (07/09/24 0947)  SpO2: 100 % (07/09/24 1047) Vital Signs (24h Range):  Temp:  [98.3 °F (36.8 °C)] 98.3 °F (36.8 °C)  Pulse:  [73-85] 73  Resp:  [19-25] 23  SpO2:  [99 %-100 %] 100 %  BP: (102)/(75) 102/75     Weight: 126.6 kg (279 lb)  Body mass index is 41.2 kg/m².     Physical Exam  Vitals reviewed.   Constitutional:       General: She is not in acute distress.     Appearance: Normal appearance.   HENT:      Head: Normocephalic and atraumatic.   Eyes:      Extraocular Movements: Extraocular movements intact.      Pupils: Pupils are equal, round, and reactive to light.   Cardiovascular:      Rate and Rhythm: Normal rate and regular rhythm.      Pulses: Normal pulses.   Pulmonary:      Effort: Pulmonary effort is normal. No respiratory distress.      Breath sounds: Normal breath sounds. No stridor. No wheezing, rhonchi or rales.   Abdominal:      Palpations: Abdomen is soft.      Tenderness: There is no abdominal tenderness.   Musculoskeletal:      Right lower leg: No edema.      Left lower leg: No edema.   Skin:     General: Skin is warm and dry.   Neurological:      General: No focal deficit present.      Mental Status: She is alert and oriented to person, place, and time.   Psychiatric:         Mood and Affect: Mood normal.         Behavior: Behavior normal.              CRANIAL NERVES     CN III, IV, VI   Pupils are equal, round, and reactive to light.       Significant Labs: All pertinent labs within the past 24 hours have been reviewed.    Significant Imaging: I have reviewed all pertinent imaging results/findings within the past 24 hours.  Assessment/Plan:     * Anaphylaxis  Respiratory distress on arrival and placed on bipap  Given epi, benadryl, steroids, famotidine with improvement  No Stridor or wheezing one exam  Continue steroids, famotidine, benadryl, duonebs  Continue IVF  Q4h neurovascular checks to  "injection site for IM epi because patient having pain in leg. Pulses present distally with doppler      COPD with acute exacerbation  Patient with possible COPD exacerbation  Allergic to all steroids except decadron which she said she can take with IV benadryl  Continue decadron and benadryl  Scheduled duonebs  Wean from bipap as tolerated  Continuous pulse ox    Tobacco abuse  Counseled on cessation  Nicotine patch PRN      Insulin dependent type 2 diabetes mellitus  Patient's FSGs are controlled on current medication regimen.  Last A1c reviewed-   Lab Results   Component Value Date    HGBA1C 8.2 (H) 02/20/2024     Most recent fingerstick glucose reviewed- No results for input(s): "POCTGLUCOSE" in the last 24 hours.  Current correctional scale  Low  Maintain anti-hyperglycemic dose as follows-   Antihyperglycemics (From admission, onward)      Start     Stop Route Frequency Ordered    07/10/24 0900  insulin glargine U-100 (Lantus) pen 10 Units         -- SubQ Daily 07/09/24 1426    07/09/24 1525  insulin aspart U-100 pen 0-5 Units         -- SubQ Before meals & nightly PRN 07/09/24 1426          Hold Oral hypoglycemics while patient is in the hospital.    Schizoaffective disorder, depressive type  Continue home medications when patient tolerating PO        Hypertension associated with type 2 diabetes mellitus  Holding home antihypertensives currently with anaphylaxis  Restart as tolerated and titrate as needed        VTE Risk Mitigation (From admission, onward)           Ordered     enoxaparin injection 40 mg  Daily         07/09/24 1426     IP VTE HIGH RISK PATIENT  Once         07/09/24 1426     Place sequential compression device  Until discontinued         07/09/24 1426                       On 07/09/2024, patient should be placed in hospital observation services under my care.             Edwar Grijalva MD  Department of Hospital Medicine  [facility]          "

## 2024-07-09 NOTE — ASSESSMENT & PLAN NOTE
"Patient's FSGs are controlled on current medication regimen.  Last A1c reviewed-   Lab Results   Component Value Date    HGBA1C 8.2 (H) 02/20/2024     Most recent fingerstick glucose reviewed- No results for input(s): "POCTGLUCOSE" in the last 24 hours.  Current correctional scale  Low  Maintain anti-hyperglycemic dose as follows-   Antihyperglycemics (From admission, onward)      Start     Stop Route Frequency Ordered    07/10/24 0900  insulin glargine U-100 (Lantus) pen 10 Units         -- SubQ Daily 07/09/24 1426    07/09/24 1525  insulin aspart U-100 pen 0-5 Units         -- SubQ Before meals & nightly PRN 07/09/24 1426          Hold Oral hypoglycemics while patient is in the hospital.  "

## 2024-07-09 NOTE — ASSESSMENT & PLAN NOTE
Holding home antihypertensives currently with anaphylaxis  Restart as tolerated and titrate as needed

## 2024-07-09 NOTE — HPI
Patient is a 53 yo w/ hx of HTN, IDDM2, COPD, Depression, Current tobacco abuse presenting with severe shortness of breath following bee sting. Patient with hx of anaphylaxis with bee stings. Does not have an epi pen. No NVD or rash. Patient did not take home medications today. No fever or chills. Breathing is improved at time of our conversation. She did have some swelling in uvula at presentation but per ED doctor this has completely resolved.

## 2024-07-10 PROBLEM — F17.200 TOBACCO DEPENDENCY: Status: ACTIVE | Noted: 2024-07-10

## 2024-07-10 LAB
ALBUMIN SERPL BCP-MCNC: 3.2 G/DL (ref 3.5–5.2)
ALP SERPL-CCNC: 84 U/L (ref 55–135)
ALT SERPL W/O P-5'-P-CCNC: 11 U/L (ref 10–44)
ANION GAP SERPL CALC-SCNC: 11 MMOL/L (ref 8–16)
AST SERPL-CCNC: 10 U/L (ref 10–40)
BASOPHILS # BLD AUTO: 0.02 K/UL (ref 0–0.2)
BASOPHILS NFR BLD: 0.2 % (ref 0–1.9)
BILIRUB SERPL-MCNC: 0.4 MG/DL (ref 0.1–1)
BUN SERPL-MCNC: 10 MG/DL (ref 6–20)
CALCIUM SERPL-MCNC: 9.1 MG/DL (ref 8.7–10.5)
CHLORIDE SERPL-SCNC: 110 MMOL/L (ref 95–110)
CO2 SERPL-SCNC: 17 MMOL/L (ref 23–29)
CREAT SERPL-MCNC: 0.7 MG/DL (ref 0.5–1.4)
DIFFERENTIAL METHOD BLD: ABNORMAL
EOSINOPHIL # BLD AUTO: 0 K/UL (ref 0–0.5)
EOSINOPHIL NFR BLD: 0.1 % (ref 0–8)
ERYTHROCYTE [DISTWIDTH] IN BLOOD BY AUTOMATED COUNT: 14.4 % (ref 11.5–14.5)
EST. GFR  (NO RACE VARIABLE): >60 ML/MIN/1.73 M^2
GLUCOSE SERPL-MCNC: 185 MG/DL (ref 70–110)
HCT VFR BLD AUTO: 40.1 % (ref 37–48.5)
HGB BLD-MCNC: 13.6 G/DL (ref 12–16)
IMM GRANULOCYTES # BLD AUTO: 0.04 K/UL (ref 0–0.04)
IMM GRANULOCYTES NFR BLD AUTO: 0.3 % (ref 0–0.5)
LYMPHOCYTES # BLD AUTO: 1.3 K/UL (ref 1–4.8)
LYMPHOCYTES NFR BLD: 10.7 % (ref 18–48)
MAGNESIUM SERPL-MCNC: 2.2 MG/DL (ref 1.6–2.6)
MCH RBC QN AUTO: 29.5 PG (ref 27–31)
MCHC RBC AUTO-ENTMCNC: 33.9 G/DL (ref 32–36)
MCV RBC AUTO: 87 FL (ref 82–98)
MONOCYTES # BLD AUTO: 0.6 K/UL (ref 0.3–1)
MONOCYTES NFR BLD: 4.9 % (ref 4–15)
NEUTROPHILS # BLD AUTO: 10 K/UL (ref 1.8–7.7)
NEUTROPHILS NFR BLD: 83.8 % (ref 38–73)
NRBC BLD-RTO: 0 /100 WBC
PHOSPHATE SERPL-MCNC: 3.3 MG/DL (ref 2.7–4.5)
PLATELET # BLD AUTO: 240 K/UL (ref 150–450)
PMV BLD AUTO: 10.5 FL (ref 9.2–12.9)
POCT GLUCOSE: 175 MG/DL (ref 70–110)
POCT GLUCOSE: 212 MG/DL (ref 70–110)
POCT GLUCOSE: 235 MG/DL (ref 70–110)
POCT GLUCOSE: 293 MG/DL (ref 70–110)
POTASSIUM SERPL-SCNC: 4.2 MMOL/L (ref 3.5–5.1)
PROT SERPL-MCNC: 6.1 G/DL (ref 6–8.4)
RBC # BLD AUTO: 4.61 M/UL (ref 4–5.4)
SODIUM SERPL-SCNC: 138 MMOL/L (ref 136–145)
WBC # BLD AUTO: 11.93 K/UL (ref 3.9–12.7)

## 2024-07-10 PROCEDURE — 96375 TX/PRO/DX INJ NEW DRUG ADDON: CPT

## 2024-07-10 PROCEDURE — 27000221 HC OXYGEN, UP TO 24 HOURS

## 2024-07-10 PROCEDURE — 96372 THER/PROPH/DIAG INJ SC/IM: CPT | Performed by: STUDENT IN AN ORGANIZED HEALTH CARE EDUCATION/TRAINING PROGRAM

## 2024-07-10 PROCEDURE — 96374 THER/PROPH/DIAG INJ IV PUSH: CPT | Mod: 59

## 2024-07-10 PROCEDURE — 94761 N-INVAS EAR/PLS OXIMETRY MLT: CPT

## 2024-07-10 PROCEDURE — 25000242 PHARM REV CODE 250 ALT 637 W/ HCPCS: Performed by: STUDENT IN AN ORGANIZED HEALTH CARE EDUCATION/TRAINING PROGRAM

## 2024-07-10 PROCEDURE — 99900035 HC TECH TIME PER 15 MIN (STAT)

## 2024-07-10 PROCEDURE — 63600175 PHARM REV CODE 636 W HCPCS: Performed by: STUDENT IN AN ORGANIZED HEALTH CARE EDUCATION/TRAINING PROGRAM

## 2024-07-10 PROCEDURE — 25000003 PHARM REV CODE 250: Performed by: STUDENT IN AN ORGANIZED HEALTH CARE EDUCATION/TRAINING PROGRAM

## 2024-07-10 PROCEDURE — 99233 SBSQ HOSP IP/OBS HIGH 50: CPT | Mod: ,,, | Performed by: STUDENT IN AN ORGANIZED HEALTH CARE EDUCATION/TRAINING PROGRAM

## 2024-07-10 PROCEDURE — 94640 AIRWAY INHALATION TREATMENT: CPT | Mod: XB

## 2024-07-10 PROCEDURE — 84100 ASSAY OF PHOSPHORUS: CPT | Performed by: STUDENT IN AN ORGANIZED HEALTH CARE EDUCATION/TRAINING PROGRAM

## 2024-07-10 PROCEDURE — 96376 TX/PRO/DX INJ SAME DRUG ADON: CPT

## 2024-07-10 PROCEDURE — 80053 COMPREHEN METABOLIC PANEL: CPT | Performed by: STUDENT IN AN ORGANIZED HEALTH CARE EDUCATION/TRAINING PROGRAM

## 2024-07-10 PROCEDURE — 21400001 HC TELEMETRY ROOM

## 2024-07-10 PROCEDURE — 83735 ASSAY OF MAGNESIUM: CPT | Performed by: STUDENT IN AN ORGANIZED HEALTH CARE EDUCATION/TRAINING PROGRAM

## 2024-07-10 PROCEDURE — 96361 HYDRATE IV INFUSION ADD-ON: CPT

## 2024-07-10 PROCEDURE — 11000001 HC ACUTE MED/SURG PRIVATE ROOM

## 2024-07-10 PROCEDURE — 85025 COMPLETE CBC W/AUTO DIFF WBC: CPT | Performed by: STUDENT IN AN ORGANIZED HEALTH CARE EDUCATION/TRAINING PROGRAM

## 2024-07-10 PROCEDURE — 94660 CPAP INITIATION&MGMT: CPT

## 2024-07-10 PROCEDURE — 36415 COLL VENOUS BLD VENIPUNCTURE: CPT | Performed by: STUDENT IN AN ORGANIZED HEALTH CARE EDUCATION/TRAINING PROGRAM

## 2024-07-10 RX ORDER — IPRATROPIUM BROMIDE AND ALBUTEROL SULFATE 2.5; .5 MG/3ML; MG/3ML
3 SOLUTION RESPIRATORY (INHALATION) EVERY 4 HOURS PRN
Status: DISCONTINUED | OUTPATIENT
Start: 2024-07-10 | End: 2024-07-11 | Stop reason: HOSPADM

## 2024-07-10 RX ORDER — ACETAMINOPHEN 325 MG/1
650 TABLET ORAL EVERY 6 HOURS PRN
Status: DISCONTINUED | OUTPATIENT
Start: 2024-07-10 | End: 2024-07-11 | Stop reason: HOSPADM

## 2024-07-10 RX ORDER — AMITRIPTYLINE HYDROCHLORIDE 25 MG/1
150 TABLET, FILM COATED ORAL NIGHTLY
Status: DISCONTINUED | OUTPATIENT
Start: 2024-07-10 | End: 2024-07-11 | Stop reason: HOSPADM

## 2024-07-10 RX ORDER — GABAPENTIN 300 MG/1
600 CAPSULE ORAL 3 TIMES DAILY
Status: DISCONTINUED | OUTPATIENT
Start: 2024-07-10 | End: 2024-07-11 | Stop reason: HOSPADM

## 2024-07-10 RX ORDER — QUETIAPINE FUMARATE 25 MG/1
100 TABLET, FILM COATED ORAL NIGHTLY
Status: DISCONTINUED | OUTPATIENT
Start: 2024-07-10 | End: 2024-07-11 | Stop reason: HOSPADM

## 2024-07-10 RX ORDER — TRAZODONE HYDROCHLORIDE 50 MG/1
100 TABLET ORAL NIGHTLY PRN
Status: DISCONTINUED | OUTPATIENT
Start: 2024-07-10 | End: 2024-07-11 | Stop reason: HOSPADM

## 2024-07-10 RX ORDER — VENLAFAXINE HYDROCHLORIDE 37.5 MG/1
150 CAPSULE, EXTENDED RELEASE ORAL DAILY
Status: DISCONTINUED | OUTPATIENT
Start: 2024-07-10 | End: 2024-07-11 | Stop reason: HOSPADM

## 2024-07-10 RX ORDER — IBUPROFEN 200 MG
1 TABLET ORAL DAILY PRN
Status: DISCONTINUED | OUTPATIENT
Start: 2024-07-10 | End: 2024-07-11 | Stop reason: HOSPADM

## 2024-07-10 RX ADMIN — QUETIAPINE FUMARATE 100 MG: 25 TABLET ORAL at 09:07

## 2024-07-10 RX ADMIN — DIPHENHYDRAMINE HYDROCHLORIDE 25 MG: 50 INJECTION, SOLUTION INTRAMUSCULAR; INTRAVENOUS at 09:07

## 2024-07-10 RX ADMIN — HYDROMORPHONE HYDROCHLORIDE 0.5 MG: 1 INJECTION, SOLUTION INTRAMUSCULAR; INTRAVENOUS; SUBCUTANEOUS at 05:07

## 2024-07-10 RX ADMIN — VENLAFAXINE HYDROCHLORIDE 150 MG: 37.5 CAPSULE, EXTENDED RELEASE ORAL at 09:07

## 2024-07-10 RX ADMIN — IPRATROPIUM BROMIDE AND ALBUTEROL SULFATE 3 ML: .5; 2.5 SOLUTION RESPIRATORY (INHALATION) at 08:07

## 2024-07-10 RX ADMIN — GABAPENTIN 600 MG: 300 CAPSULE ORAL at 09:07

## 2024-07-10 RX ADMIN — DEXAMETHASONE SODIUM PHOSPHATE 8 MG: 4 INJECTION, SOLUTION INTRA-ARTICULAR; INTRALESIONAL; INTRAMUSCULAR; INTRAVENOUS; SOFT TISSUE at 05:07

## 2024-07-10 RX ADMIN — IPRATROPIUM BROMIDE AND ALBUTEROL SULFATE 3 ML: .5; 2.5 SOLUTION RESPIRATORY (INHALATION) at 06:07

## 2024-07-10 RX ADMIN — ACETAMINOPHEN 650 MG: 325 TABLET ORAL at 06:07

## 2024-07-10 RX ADMIN — IPRATROPIUM BROMIDE AND ALBUTEROL SULFATE 3 ML: .5; 2.5 SOLUTION RESPIRATORY (INHALATION) at 12:07

## 2024-07-10 RX ADMIN — ENOXAPARIN SODIUM 40 MG: 40 INJECTION SUBCUTANEOUS at 05:07

## 2024-07-10 RX ADMIN — FAMOTIDINE 20 MG: 10 INJECTION, SOLUTION INTRAVENOUS at 11:07

## 2024-07-10 RX ADMIN — IPRATROPIUM BROMIDE AND ALBUTEROL SULFATE 3 ML: .5; 2.5 SOLUTION RESPIRATORY (INHALATION) at 01:07

## 2024-07-10 RX ADMIN — INSULIN GLARGINE 10 UNITS: 100 INJECTION, SOLUTION SUBCUTANEOUS at 09:07

## 2024-07-10 RX ADMIN — FAMOTIDINE 20 MG: 10 INJECTION, SOLUTION INTRAVENOUS at 09:07

## 2024-07-10 RX ADMIN — IPRATROPIUM BROMIDE AND ALBUTEROL SULFATE 3 ML: .5; 2.5 SOLUTION RESPIRATORY (INHALATION) at 05:07

## 2024-07-10 RX ADMIN — INSULIN ASPART 1 UNITS: 100 INJECTION, SOLUTION INTRAVENOUS; SUBCUTANEOUS at 09:07

## 2024-07-10 RX ADMIN — GABAPENTIN 600 MG: 300 CAPSULE ORAL at 02:07

## 2024-07-10 RX ADMIN — ACETAMINOPHEN 650 MG: 325 TABLET ORAL at 10:07

## 2024-07-10 RX ADMIN — AMITRIPTYLINE HYDROCHLORIDE 150 MG: 25 TABLET, FILM COATED ORAL at 09:07

## 2024-07-10 RX ADMIN — ACETAMINOPHEN 650 MG: 325 TABLET ORAL at 09:07

## 2024-07-10 RX ADMIN — DIPHENHYDRAMINE HYDROCHLORIDE 25 MG: 50 INJECTION, SOLUTION INTRAMUSCULAR; INTRAVENOUS at 02:07

## 2024-07-10 RX ADMIN — DEXAMETHASONE SODIUM PHOSPHATE 8 MG: 4 INJECTION, SOLUTION INTRA-ARTICULAR; INTRALESIONAL; INTRAMUSCULAR; INTRAVENOUS; SOFT TISSUE at 09:07

## 2024-07-10 RX ADMIN — INSULIN ASPART 2 UNITS: 100 INJECTION, SOLUTION INTRAVENOUS; SUBCUTANEOUS at 04:07

## 2024-07-10 RX ADMIN — INSULIN ASPART 2 UNITS: 100 INJECTION, SOLUTION INTRAVENOUS; SUBCUTANEOUS at 11:07

## 2024-07-10 RX ADMIN — DEXAMETHASONE SODIUM PHOSPHATE 8 MG: 4 INJECTION, SOLUTION INTRA-ARTICULAR; INTRALESIONAL; INTRAMUSCULAR; INTRAVENOUS; SOFT TISSUE at 02:07

## 2024-07-10 RX ADMIN — SODIUM CHLORIDE, POTASSIUM CHLORIDE, SODIUM LACTATE AND CALCIUM CHLORIDE: 600; 310; 30; 20 INJECTION, SOLUTION INTRAVENOUS at 04:07

## 2024-07-10 RX ADMIN — DIPHENHYDRAMINE HYDROCHLORIDE 25 MG: 50 INJECTION, SOLUTION INTRAMUSCULAR; INTRAVENOUS at 05:07

## 2024-07-10 NOTE — SUBJECTIVE & OBJECTIVE
Interval History: Patient now on RA. Continues to have SOB and feels like she is having exacerbation of COPD. Does not feel comfortable going home at this point.    Review of Systems   All other systems reviewed and are negative.    Objective:     Vital Signs (Most Recent):  Temp: 98.3 °F (36.8 °C) (07/10/24 0800)  Pulse: 68 (07/10/24 0832)  Resp: (!) 24 (07/10/24 0832)  BP: 126/68 (07/10/24 0800)  SpO2: 100 % (07/10/24 0832) Vital Signs (24h Range):  Temp:  [97.7 °F (36.5 °C)-98.5 °F (36.9 °C)] 98.3 °F (36.8 °C)  Pulse:  [64-85] 68  Resp:  [20-44] 24  SpO2:  [95 %-100 %] 100 %  BP: (112-164)/(54-96) 126/68     Weight: 124.2 kg (273 lb 13 oz)  Body mass index is 40.43 kg/m².    Intake/Output Summary (Last 24 hours) at 7/10/2024 1219  Last data filed at 7/10/2024 0614  Gross per 24 hour   Intake 4190.75 ml   Output 3300 ml   Net 890.75 ml         Physical Exam      Vitals reviewed  General: NAD, Well developed, Well Nourished  Head: NC/AT  ENT: No swelling in oropharynx. Uvula normal size. No sign of airway obstruction.   Eyes: EOMI, YUNIER  Cardiovascular: Pulses intact distally, Regular Rate and rhythm  Pulmonary: increased Respiratory Rate, No respiratory distress, wheezing on exam  Gi: Soft, Non-tender  Extremities: Warm, No edema present  Skin: Warm, dry  Neuro: Alert, Oriented x3, No focal Deficit  Psych: Appropriate mood and affect      Significant Labs: All pertinent labs within the past 24 hours have been reviewed.    Significant Imaging: I have reviewed all pertinent imaging results/findings within the past 24 hours.

## 2024-07-10 NOTE — PLAN OF CARE
Problem: Adult Inpatient Plan of Care  Goal: Optimal Comfort and Wellbeing  Outcome: Progressing     Problem: Bariatric Environmental Safety  Goal: Safety Maintained with Care  Outcome: Progressing  Intervention: Promote Safety and Comfort  Flowsheets (Taken 7/10/2024 0554)  Bariatric Safety: bariatric commode at bedside     Problem: Diabetes Comorbidity  Goal: Blood Glucose Level Within Targeted Range  Outcome: Progressing  Intervention: Monitor and Manage Glycemia  Flowsheets (Taken 7/10/2024 0554)  Glycemic Management:   blood glucose monitored   oral hydration promoted     Problem: Pain Acute  Goal: Optimal Pain Control and Function  Intervention: Develop Pain Management Plan  Flowsheets (Taken 7/10/2024 0554)  Pain Management Interventions:   care clustered   medication offered   pain management plan reviewed with patient/caregiver   quiet environment facilitated     Problem: Pain Acute  Goal: Optimal Pain Control and Function  Intervention: Optimize Psychosocial Wellbeing  Flowsheets (Taken 7/10/2024 0554)  Supportive Measures:   active listening utilized   self-responsibility promoted   verbalization of feelings encouraged  Diversional Activities: television     Problem: Hypertension Acute  Goal: Blood Pressure Within Desired Range  Outcome: Progressing  Intervention: Normalize Blood Pressure  Flowsheets (Taken 7/10/2024 0554)  Medication Review/Management: high-risk medications identified     Problem: Allergic/Anaphylactic Reaction  Goal: Resolution of Hypersensitivity Symptoms  Outcome: Progressing

## 2024-07-10 NOTE — PROGRESS NOTES
[facility] Medicine  Progress Note    Patient Name: Sommer Guevara  MRN: [id number]  Patient Class: OP- Observation   Admission Date: 7/9/2024  Length of Stay: 0 days  Attending Physician: Edwar Grijalva MD  Primary Care Provider: Ivelisse Garland MD        Subjective:     Principal Problem:Anaphylaxis        HPI:  Patient is a 55 yo w/ hx of HTN, IDDM2, COPD, Depression, Current tobacco abuse presenting with severe shortness of breath following bee sting. Patient with hx of anaphylaxis with bee stings. Does not have an epi pen. No NVD or rash. Patient did not take home medications today. No fever or chills. Breathing is improved at time of our conversation. She did have some swelling in uvula at presentation but per ED doctor this has completely resolved.     Overview/Hospital Course:  No notes on file    Interval History: Patient now on RA. Continues to have SOB and feels like she is having exacerbation of COPD. Does not feel comfortable going home at this point.    Review of Systems   All other systems reviewed and are negative.    Objective:     Vital Signs (Most Recent):  Temp: 98.3 °F (36.8 °C) (07/10/24 0800)  Pulse: 68 (07/10/24 0832)  Resp: (!) 24 (07/10/24 0832)  BP: 126/68 (07/10/24 0800)  SpO2: 100 % (07/10/24 0832) Vital Signs (24h Range):  Temp:  [97.7 °F (36.5 °C)-98.5 °F (36.9 °C)] 98.3 °F (36.8 °C)  Pulse:  [64-85] 68  Resp:  [20-44] 24  SpO2:  [95 %-100 %] 100 %  BP: (112-164)/(54-96) 126/68     Weight: 124.2 kg (273 lb 13 oz)  Body mass index is 40.43 kg/m².    Intake/Output Summary (Last 24 hours) at 7/10/2024 1219  Last data filed at 7/10/2024 0614  Gross per 24 hour   Intake 4190.75 ml   Output 3300 ml   Net 890.75 ml         Physical Exam      Vitals reviewed  General: NAD, Well developed, Well Nourished  Head: NC/AT  ENT: No swelling in oropharynx. Uvula normal size. No sign of airway obstruction.   Eyes: EOMI, YUNIER  Cardiovascular: Pulses intact  "distally, Regular Rate and rhythm  Pulmonary: increased Respiratory Rate, No respiratory distress, wheezing on exam  Gi: Soft, Non-tender  Extremities: Warm, No edema present  Skin: Warm, dry  Neuro: Alert, Oriented x3, No focal Deficit  Psych: Appropriate mood and affect      Significant Labs: All pertinent labs within the past 24 hours have been reviewed.    Significant Imaging: I have reviewed all pertinent imaging results/findings within the past 24 hours.    Assessment/Plan:      * Anaphylaxis  Respiratory distress on arrival and placed on bipap  Given epi, benadryl, steroids, famotidine with improvement  No Stridor or wheezing one exam  Continue steroids, famotidine, benadryl, duonebs  Continue IVF  Q4h neurovascular checks to injection site for IM epi because patient having pain in leg. Pulses present distally with doppler      COPD with acute exacerbation  Patient with possible COPD exacerbation  Allergic to all steroids except decadron which she said she can take with IV benadryl  Continue decadron and benadryl  Scheduled duonebs  Wean from bipap as tolerated  Continuous pulse ox    Tobacco abuse  Counseled on cessation  Nicotine patch PRN      Insulin dependent type 2 diabetes mellitus  Patient's FSGs are controlled on current medication regimen.  Last A1c reviewed-   Lab Results   Component Value Date    HGBA1C 8.2 (H) 02/20/2024     Most recent fingerstick glucose reviewed- No results for input(s): "POCTGLUCOSE" in the last 24 hours.  Current correctional scale  Low  Maintain anti-hyperglycemic dose as follows-   Antihyperglycemics (From admission, onward)      Start     Stop Route Frequency Ordered    07/10/24 0900  insulin glargine U-100 (Lantus) pen 10 Units         -- SubQ Daily 07/09/24 1426    07/09/24 1525  insulin aspart U-100 pen 0-5 Units         -- SubQ Before meals & nightly PRN 07/09/24 1426          Hold Oral hypoglycemics while patient is in the hospital.    Schizoaffective disorder, " depressive type  Continue home medications when patient tolerating PO        Hypertension associated with type 2 diabetes mellitus  Holding home antihypertensives currently with anaphylaxis  Restart as tolerated and titrate as needed        VTE Risk Mitigation (From admission, onward)           Ordered     enoxaparin injection 40 mg  Daily         07/09/24 1426     IP VTE HIGH RISK PATIENT  Once         07/09/24 1426     Place sequential compression device  Until discontinued         07/09/24 1426                    Discharge Planning   MARY: 7/10/2024     Code Status: Full Code   Is the patient medically ready for discharge?:     Reason for patient still in hospital (select all that apply): Treatment  Discharge Plan A: Home                  Edwar Grijalva MD  Department of Hospital Medicine   [facility]

## 2024-07-10 NOTE — PLAN OF CARE
CARR verbally signed by Pt at bedside.        07/10/24 1006   Medicare Message   Important Message from Medicare regarding Discharge Appeal Rights  --    Date IMM was signed  --    Time IMM was signed  --    CARR Message   Medicare Outpatient and Observation Notification regarding financial responsibility Given to patient/caregiver;Explained to patient/caregiver;Signed/date by patient/caregiver   Date CARR was signed 07/10/24   Time CARR was signed 1000

## 2024-07-10 NOTE — PLAN OF CARE
[facility]  Initial Discharge Assessment      Assessment completed at bedside with Pt, and all information on FaceSheet confirmed, including demographics, PCP, pharmacy and insurance.  Pt has not addressed advance directives, and reports Alyssa Skelton(Sister) [phone] is her NOK. She currently lives in a  group home in [facility] with 5 other residents and the 2 managers.  Her PCP is  Dr. Garland , and last appointment was 2 months ago. Her preferred pharmacy is [facility]. She denies any DME/HH/HD/Blood Thinners.  For transportation to appointments, and at discharge,  Gabby kenan provide. She denies any recent hospitalizations. Plan for discharge is to return home.  Case Management to continue to follow for discharge planning needs.               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 No  Secondary Emergency Contact: Jocy Singh  Mobile Phone: [phone]  Relation: Friend    Discharge Plan A: Home  Discharge Plan B: Home      [facility] - [place], [place] - [address]  [address] [place] [address]  Phone: [phone] Fax: [phone]    [id number] - [place] [place] - [address]  [address] [address]  Phone: [phone] Fax:  [phone]    [facility] - [place], [place] - [address] [facility]  [address] [facility] [address]  Phone: [phone] Fax: [phone]      Initial Assessment (most recent)       Adult Discharge Assessment - 07/10/24 1006          Discharge Assessment    Assessment Type Discharge Planning Assessment     Confirmed/corrected address, phone number and insurance Yes     Confirmed Demographics Correct on Facesheet     Source of Information patient     Reason For Admission shortness of breath     People in Home other relative(s)   Pt lives in group home with 5 others    Facility Arrived From: home     Do you expect to return to your current living situation? Yes     Do you have help at home or someone to help you manage your care at home? Yes     Who are your caregiver(s) and their phone number(s)? CostaAlyssa (Sister)  [phone]     Prior to hospitilization cognitive status: Alert/Oriented     Current cognitive status: Alert/Oriented     Walking or Climbing Stairs Difficulty no     Dressing/Bathing Difficulty no     Do you have any problems with: Errands/Grocery    catia Sandra and Gabby    Home Layout Able to live on 1st floor     Equipment Currently Used at Home none     Readmission within 30 days? No     Patient currently being followed by outpatient case management? No     Do you currently have service(s) that help you manage your care at home? No     Do you take prescription medications? Yes     Do you have prescription coverage? Yes     Coverage [employer]     Do you have any problems affording any of your prescribed medications? No     Is the patient taking medications as prescribed? yes     Who is going to help you get home at discharge?  Gabby     How do you get to doctors appointments? family or friend will provide     Are you on dialysis? No     Do you take coumadin? No     Discharge Plan A Home     Discharge Plan B Home     DME Needed  Upon Discharge  none     Discharge Plan discussed with: Patient     Transition of Care Barriers None        Physical Activity    On average, how many days per week do you engage in moderate to strenuous exercise (like a brisk walk)? 3 days     On average, how many minutes do you engage in exercise at this level? 20 min        Financial Resource Strain    How hard is it for you to pay for the very basics like food, housing, medical care, and heating? Somewhat hard        Housing Stability    In the last 12 months, was there a time when you were not able to pay the mortgage or rent on time? No     At any time in the past 12 months, were you homeless or living in a shelter (including now)? No        Transportation Needs    Has the lack of transportation kept you from medical appointments, meetings, work or from getting things needed for daily living? No        Food Insecurity    Within the past 12 months, you worried that your food would run out before you got the money to buy more. Never true     Within the past 12 months, the food you bought just didn't last and you didn't have money to get more. Never true        Stress    Do you feel stress - tense, restless, nervous, or anxious, or unable to sleep at night because your mind is troubled all the time - these days? Not at all        Alcohol Use    Q1: How often do you have a drink containing alcohol? Never     Q2: How many drinks containing alcohol do you have on a typical day when you are drinking? Patient does not drink     Q3: How often do you have six or more drinks on one occasion? Never        Utilities    In the past 12 months has the electric, gas, oil, or water company threatened to shut off services in your home? No

## 2024-07-11 VITALS
RESPIRATION RATE: 20 BRPM | HEART RATE: 72 BPM | BODY MASS INDEX: 40.56 KG/M2 | TEMPERATURE: 98 F | WEIGHT: 273.81 LBS | DIASTOLIC BLOOD PRESSURE: 64 MMHG | SYSTOLIC BLOOD PRESSURE: 149 MMHG | HEIGHT: 69 IN | OXYGEN SATURATION: 95 %

## 2024-07-11 LAB
ALBUMIN SERPL BCP-MCNC: 3.2 G/DL (ref 3.5–5.2)
ALP SERPL-CCNC: 94 U/L (ref 55–135)
ALT SERPL W/O P-5'-P-CCNC: 15 U/L (ref 10–44)
ANION GAP SERPL CALC-SCNC: 11 MMOL/L (ref 8–16)
AST SERPL-CCNC: 12 U/L (ref 10–40)
BASOPHILS # BLD AUTO: 0.01 K/UL (ref 0–0.2)
BASOPHILS NFR BLD: 0.1 % (ref 0–1.9)
BILIRUB SERPL-MCNC: 0.3 MG/DL (ref 0.1–1)
BUN SERPL-MCNC: 15 MG/DL (ref 6–20)
CALCIUM SERPL-MCNC: 9.4 MG/DL (ref 8.7–10.5)
CHLORIDE SERPL-SCNC: 108 MMOL/L (ref 95–110)
CO2 SERPL-SCNC: 18 MMOL/L (ref 23–29)
CREAT SERPL-MCNC: 0.8 MG/DL (ref 0.5–1.4)
DIFFERENTIAL METHOD BLD: ABNORMAL
EOSINOPHIL # BLD AUTO: 0 K/UL (ref 0–0.5)
EOSINOPHIL NFR BLD: 0.2 % (ref 0–8)
ERYTHROCYTE [DISTWIDTH] IN BLOOD BY AUTOMATED COUNT: 14.5 % (ref 11.5–14.5)
EST. GFR  (NO RACE VARIABLE): >60 ML/MIN/1.73 M^2
GLUCOSE SERPL-MCNC: 262 MG/DL (ref 70–110)
HCT VFR BLD AUTO: 40.7 % (ref 37–48.5)
HGB BLD-MCNC: 13.6 G/DL (ref 12–16)
IMM GRANULOCYTES # BLD AUTO: 0.07 K/UL (ref 0–0.04)
IMM GRANULOCYTES NFR BLD AUTO: 0.5 % (ref 0–0.5)
LYMPHOCYTES # BLD AUTO: 0.9 K/UL (ref 1–4.8)
LYMPHOCYTES NFR BLD: 7.1 % (ref 18–48)
MAGNESIUM SERPL-MCNC: 2 MG/DL (ref 1.6–2.6)
MCH RBC QN AUTO: 29.4 PG (ref 27–31)
MCHC RBC AUTO-ENTMCNC: 33.4 G/DL (ref 32–36)
MCV RBC AUTO: 88 FL (ref 82–98)
MONOCYTES # BLD AUTO: 0.6 K/UL (ref 0.3–1)
MONOCYTES NFR BLD: 4.1 % (ref 4–15)
NEUTROPHILS # BLD AUTO: 11.7 K/UL (ref 1.8–7.7)
NEUTROPHILS NFR BLD: 88 % (ref 38–73)
NRBC BLD-RTO: 0 /100 WBC
PHOSPHATE SERPL-MCNC: 3.2 MG/DL (ref 2.7–4.5)
PLATELET # BLD AUTO: 250 K/UL (ref 150–450)
PMV BLD AUTO: 10.8 FL (ref 9.2–12.9)
POCT GLUCOSE: 231 MG/DL (ref 70–110)
POCT GLUCOSE: 251 MG/DL (ref 70–110)
POTASSIUM SERPL-SCNC: 4.6 MMOL/L (ref 3.5–5.1)
PROT SERPL-MCNC: 6.2 G/DL (ref 6–8.4)
RBC # BLD AUTO: 4.63 M/UL (ref 4–5.4)
SODIUM SERPL-SCNC: 137 MMOL/L (ref 136–145)
WBC # BLD AUTO: 13.27 K/UL (ref 3.9–12.7)

## 2024-07-11 PROCEDURE — 63600175 PHARM REV CODE 636 W HCPCS: Performed by: STUDENT IN AN ORGANIZED HEALTH CARE EDUCATION/TRAINING PROGRAM

## 2024-07-11 PROCEDURE — 80053 COMPREHEN METABOLIC PANEL: CPT | Performed by: STUDENT IN AN ORGANIZED HEALTH CARE EDUCATION/TRAINING PROGRAM

## 2024-07-11 PROCEDURE — 99900035 HC TECH TIME PER 15 MIN (STAT)

## 2024-07-11 PROCEDURE — 94761 N-INVAS EAR/PLS OXIMETRY MLT: CPT

## 2024-07-11 PROCEDURE — 25000003 PHARM REV CODE 250: Performed by: STUDENT IN AN ORGANIZED HEALTH CARE EDUCATION/TRAINING PROGRAM

## 2024-07-11 PROCEDURE — 83735 ASSAY OF MAGNESIUM: CPT | Performed by: STUDENT IN AN ORGANIZED HEALTH CARE EDUCATION/TRAINING PROGRAM

## 2024-07-11 PROCEDURE — 36415 COLL VENOUS BLD VENIPUNCTURE: CPT | Performed by: STUDENT IN AN ORGANIZED HEALTH CARE EDUCATION/TRAINING PROGRAM

## 2024-07-11 PROCEDURE — 94640 AIRWAY INHALATION TREATMENT: CPT

## 2024-07-11 PROCEDURE — 25000242 PHARM REV CODE 250 ALT 637 W/ HCPCS: Performed by: STUDENT IN AN ORGANIZED HEALTH CARE EDUCATION/TRAINING PROGRAM

## 2024-07-11 PROCEDURE — 84100 ASSAY OF PHOSPHORUS: CPT | Performed by: STUDENT IN AN ORGANIZED HEALTH CARE EDUCATION/TRAINING PROGRAM

## 2024-07-11 PROCEDURE — 85025 COMPLETE CBC W/AUTO DIFF WBC: CPT | Performed by: STUDENT IN AN ORGANIZED HEALTH CARE EDUCATION/TRAINING PROGRAM

## 2024-07-11 RX ORDER — PREDNISONE 10 MG/1
40 TABLET ORAL DAILY
Qty: 20 TABLET | Refills: 0 | Status: SHIPPED | OUTPATIENT
Start: 2024-07-11 | End: 2024-07-16

## 2024-07-11 RX ORDER — LISINOPRIL 10 MG/1
10 TABLET ORAL DAILY
Status: DISCONTINUED | OUTPATIENT
Start: 2024-07-11 | End: 2024-07-11 | Stop reason: HOSPADM

## 2024-07-11 RX ORDER — DIPHENHYDRAMINE HCL 25 MG
4 CAPSULE ORAL
Qty: 100 EACH | Refills: 0 | Status: SHIPPED | OUTPATIENT
Start: 2024-07-11 | End: 2024-08-10

## 2024-07-11 RX ORDER — METOPROLOL SUCCINATE 25 MG/1
50 TABLET, EXTENDED RELEASE ORAL DAILY
Status: DISCONTINUED | OUTPATIENT
Start: 2024-07-11 | End: 2024-07-11 | Stop reason: HOSPADM

## 2024-07-11 RX ORDER — LORAZEPAM 1 MG/1
1 TABLET ORAL EVERY 6 HOURS PRN
Status: DISCONTINUED | OUTPATIENT
Start: 2024-07-11 | End: 2024-07-11 | Stop reason: HOSPADM

## 2024-07-11 RX ORDER — EPINEPHRINE 0.3 MG/.3ML
1 INJECTION SUBCUTANEOUS ONCE
Qty: 0.3 ML | Refills: 0 | Status: SHIPPED | OUTPATIENT
Start: 2024-07-11 | End: 2024-07-11

## 2024-07-11 RX ORDER — POLYETHYLENE GLYCOL 3350 17 G/17G
17 POWDER, FOR SOLUTION ORAL DAILY
Status: DISCONTINUED | OUTPATIENT
Start: 2024-07-11 | End: 2024-07-11 | Stop reason: HOSPADM

## 2024-07-11 RX ORDER — DIPHENHYDRAMINE HCL 25 MG
25 CAPSULE ORAL EVERY 6 HOURS PRN
Qty: 40 CAPSULE | Refills: 0 | Status: SHIPPED | OUTPATIENT
Start: 2024-07-11 | End: 2024-07-21

## 2024-07-11 RX ADMIN — LORAZEPAM 1 MG: 1 TABLET ORAL at 01:07

## 2024-07-11 RX ADMIN — ACETAMINOPHEN 650 MG: 325 TABLET ORAL at 08:07

## 2024-07-11 RX ADMIN — IPRATROPIUM BROMIDE AND ALBUTEROL SULFATE 3 ML: .5; 2.5 SOLUTION RESPIRATORY (INHALATION) at 04:07

## 2024-07-11 RX ADMIN — IPRATROPIUM BROMIDE AND ALBUTEROL SULFATE 3 ML: .5; 2.5 SOLUTION RESPIRATORY (INHALATION) at 06:07

## 2024-07-11 RX ADMIN — INSULIN GLARGINE 10 UNITS: 100 INJECTION, SOLUTION SUBCUTANEOUS at 08:07

## 2024-07-11 RX ADMIN — METOPROLOL SUCCINATE 50 MG: 25 TABLET, EXTENDED RELEASE ORAL at 08:07

## 2024-07-11 RX ADMIN — DIPHENHYDRAMINE HYDROCHLORIDE 25 MG: 50 INJECTION, SOLUTION INTRAMUSCULAR; INTRAVENOUS at 05:07

## 2024-07-11 RX ADMIN — GABAPENTIN 600 MG: 300 CAPSULE ORAL at 08:07

## 2024-07-11 RX ADMIN — INSULIN ASPART 2 UNITS: 100 INJECTION, SOLUTION INTRAVENOUS; SUBCUTANEOUS at 11:07

## 2024-07-11 RX ADMIN — DEXAMETHASONE SODIUM PHOSPHATE 8 MG: 4 INJECTION, SOLUTION INTRA-ARTICULAR; INTRALESIONAL; INTRAMUSCULAR; INTRAVENOUS; SOFT TISSUE at 05:07

## 2024-07-11 RX ADMIN — LISINOPRIL 10 MG: 10 TABLET ORAL at 08:07

## 2024-07-11 RX ADMIN — INSULIN ASPART 3 UNITS: 100 INJECTION, SOLUTION INTRAVENOUS; SUBCUTANEOUS at 08:07

## 2024-07-11 RX ADMIN — POLYETHYLENE GLYCOL (3350) 17 G: 17 POWDER, FOR SOLUTION ORAL at 08:07

## 2024-07-11 RX ADMIN — IPRATROPIUM BROMIDE AND ALBUTEROL SULFATE 3 ML: .5; 2.5 SOLUTION RESPIRATORY (INHALATION) at 12:07

## 2024-07-11 RX ADMIN — VENLAFAXINE HYDROCHLORIDE 150 MG: 37.5 CAPSULE, EXTENDED RELEASE ORAL at 08:07

## 2024-07-11 RX ADMIN — LORAZEPAM 1 MG: 1 TABLET ORAL at 08:07

## 2024-07-11 RX ADMIN — IPRATROPIUM BROMIDE AND ALBUTEROL SULFATE 3 ML: .5; 2.5 SOLUTION RESPIRATORY (INHALATION) at 09:07

## 2024-07-11 NOTE — NURSING
Instructed by Dr. Edwar Grijalva that patient does not need another iv, and that all iv medications will be discontinued.

## 2024-07-11 NOTE — NURSING
Notified Edwar Grijalva MD of patient and situation, ISBAR provided, informed MD that patient is requesting Miralax. Orders placed, see MAR.

## 2024-07-11 NOTE — PLAN OF CARE
Problem: Adult Inpatient Plan of Care  Goal: Plan of Care Review  7/11/2024 1225 by Brittany Aguilar RN  Outcome: Met  7/11/2024 0738 by Brittany Aguilar RN  Outcome: Progressing  Goal: Patient-Specific Goal (Individualized)  7/11/2024 1225 by Brittany Aguilar RN  Outcome: Met  7/11/2024 0738 by Brittany Aguilar RN  Outcome: Progressing  Goal: Absence of Hospital-Acquired Illness or Injury  7/11/2024 1225 by Brittany Aguilar RN  Outcome: Met  7/11/2024 0738 by Brittany Aguilar RN  Outcome: Progressing  Goal: Optimal Comfort and Wellbeing  7/11/2024 1225 by Brittany Aguilar RN  Outcome: Met  7/11/2024 0738 by Brittany Aguilar RN  Outcome: Progressing  Goal: Readiness for Transition of Care  7/11/2024 1225 by Brittany Aguilar RN  Outcome: Met  7/11/2024 0738 by Brittany Aguilar RN  Outcome: Progressing     Problem: Bariatric Environmental Safety  Goal: Safety Maintained with Care  7/11/2024 1225 by Brittany Aguilar RN  Outcome: Met  7/11/2024 0738 by Brittany Aguilar RN  Outcome: Progressing     Problem: Diabetes Comorbidity  Goal: Blood Glucose Level Within Targeted Range  7/11/2024 1225 by Brittany Aguilar RN  Outcome: Met  7/11/2024 0738 by Brittany Aguilar RN  Outcome: Progressing     Problem: Skin Injury Risk Increased  Goal: Skin Health and Integrity  7/11/2024 1225 by Brittany Aguilar RN  Outcome: Met  7/11/2024 0738 by Brittany Aguilar RN  Outcome: Progressing     Problem: Pain Acute  Goal: Optimal Pain Control and Function  7/11/2024 1225 by Brittany Aguilar RN  Outcome: Met  7/11/2024 0738 by Brittany Aguilar RN  Outcome: Progressing     Problem: Hypertension Acute  Goal: Blood Pressure Within Desired Range  7/11/2024 1225 by Brittany Aguilar RN  Outcome: Met  7/11/2024 0738 by Brittany Aguilar RN  Outcome: Progressing     Problem: Allergic/Anaphylactic Reaction  Goal: Resolution of Hypersensitivity Symptoms  7/11/2024 1225 by Brittany Aguilar, RN  Outcome: Met  7/11/2024 0738 by Brittany Aguilar,  RN  Outcome: Progressing

## 2024-07-11 NOTE — NURSING
AVS discharge paperwork provided to patient and group home personnel. Discharge education provided to patient including information on new prescriptions ordered by physician to be picked up at designated pharmacy, home medications ordered by MD to resume taking once discharged, follow up appointments, medication education, and education on hospitalization diagnosis and treatment plan for home. Patient and group home personnel verbalized understanding of discharge paperwork and education. IV discontinued earlier this shift-see documentation, IV catheter tip intact, site is clean, dry, and intact, applied gauze dressing to IV site. Telemetry discontinued. Discharge care complete. Patient discharged via wheelchair with patient belongings, group home personnel, and discharge paperwork.

## 2024-07-11 NOTE — PLAN OF CARE
Patient in no apparent distress. Shortness of breath reported. Aerosol treatments given. BIPAP discontinued. Patient discharged.

## 2024-07-11 NOTE — PLAN OF CARE
07/10/24 1630   Medicare Message   Important Message from Medicare regarding Discharge Appeal Rights Given to patient/caregiver;Explained to patient/caregiver;Signed/date by patient/caregiver   Date IMM was signed 07/10/24   Time IMM was signed [id number]

## 2024-07-11 NOTE — DISCHARGE INSTRUCTIONS
Notify your health care provider if you experience any of the following:  difficulty breathing or increased cough            Activity as tolerated

## 2024-07-11 NOTE — PLAN OF CARE
Problem: Adult Inpatient Plan of Care  Goal: Plan of Care Review  Outcome: Progressing  Goal: Patient-Specific Goal (Individualized)  Outcome: Progressing  Goal: Absence of Hospital-Acquired Illness or Injury  Outcome: Progressing  Goal: Optimal Comfort and Wellbeing  Outcome: Progressing  Goal: Readiness for Transition of Care  Outcome: Progressing     Problem: Bariatric Environmental Safety  Goal: Safety Maintained with Care  Outcome: Progressing     Problem: Diabetes Comorbidity  Goal: Blood Glucose Level Within Targeted Range  Outcome: Progressing     Problem: Skin Injury Risk Increased  Goal: Skin Health and Integrity  Outcome: Progressing     Problem: Pain Acute  Goal: Optimal Pain Control and Function  Outcome: Progressing     Problem: Hypertension Acute  Goal: Blood Pressure Within Desired Range  Outcome: Progressing     Problem: Allergic/Anaphylactic Reaction  Goal: Resolution of Hypersensitivity Symptoms  Outcome: Progressing

## 2024-07-11 NOTE — PLAN OF CARE
Problem: Adult Inpatient Plan of Care  Goal: Plan of Care Review  7/11/2024 0427 by Maria L Cruz RN  Outcome: Progressing  7/10/2024 2234 by Maria L Cruz RN  Outcome: Progressing  Goal: Patient-Specific Goal (Individualized)  7/11/2024 0427 by Maria L Cruz RN  Outcome: Progressing  7/10/2024 2234 by Maria L Cruz RN  Outcome: Progressing  Goal: Absence of Hospital-Acquired Illness or Injury  7/11/2024 0427 by Maria L Cruz RN  Outcome: Progressing  7/10/2024 2234 by Maria L Cruz RN  Outcome: Progressing  Goal: Optimal Comfort and Wellbeing  7/11/2024 0427 by Maria L Cruz RN  Outcome: Progressing  7/10/2024 2234 by Maria L Cruz RN  Outcome: Progressing  Goal: Readiness for Transition of Care  7/11/2024 0427 by Maria L Cruz RN  Outcome: Progressing  7/10/2024 2234 by Maria L Cruz RN  Outcome: Progressing     Problem: Bariatric Environmental Safety  Goal: Safety Maintained with Care  7/11/2024 0427 by Maria L Cruz, RN  Outcome: Progressing  7/10/2024 2234 by Maria L Cruz RN  Outcome: Progressing     Problem: Diabetes Comorbidity  Goal: Blood Glucose Level Within Targeted Range  7/11/2024 0427 by Maria L Cruz, RN  Outcome: Progressing  7/10/2024 2234 by Maria L Cruz RN  Outcome: Progressing     Problem: Skin Injury Risk Increased  Goal: Skin Health and Integrity  7/11/2024 0427 by Maria L Cruz, RN  Outcome: Progressing  7/10/2024 2234 by Maria L Cruz RN  Outcome: Progressing     Problem: Pain Acute  Goal: Optimal Pain Control and Function  7/11/2024 0427 by Maria L Cruz, RN  Outcome: Progressing  7/10/2024 2234 by Brimingham, Maria L L., RN  Outcome: Progressing     Problem: Hypertension Acute  Goal: Blood Pressure Within Desired Range  7/11/2024 0427 by Maria L Cruz RN  Outcome: Progressing  7/10/2024 2234 by Maria L Cruz RN  Outcome:  Progressing     Problem: Allergic/Anaphylactic Reaction  Goal: Resolution of Hypersensitivity Symptoms  7/11/2024 0427 by Maria L Cruz RN  Outcome: Progressing  7/10/2024 2234 by Maria L Cruz, RN  Outcome: Progressing

## 2024-07-11 NOTE — PLAN OF CARE
[facility] - Comprehensive [facility] Unit  Discharge Final Note    Primary Care Provider: Ivelisse Garland MD    Expected Discharge Date: 7/11/2024    Verbal follow up appointment with Dr Garland provided to patient. Spoke to Jocy Singh owner of group home who states she will be here around 2:00pm to pick her up. Patient demonstrated understanding by verbal feedback. Denies any other needs at this time. OK for DC from CM standpoint.     Final Discharge Note (most recent)       Final Note - 07/11/24 1050          Final Note    Assessment Type Final Discharge Note     Anticipated Discharge Disposition Home or Self Care     What phone number can be called within the next 1-3 days to see how you are doing after discharge? [phone]     Hospital Resources/Appts/Education Provided Appointments scheduled and added to AVS        Post-Acute Status    Discharge Delays None known at this time                     Important Message from Medicare             Contact Info       Ivelisse Garland MD   Specialty: Family Medicine   Relationship: PCP - General    [address] [place] [address]   Phone: [phone]       Next Steps: Go on 7/17/2024    Instructions: appointment Wed July 17th at 2:30pm for hospital follow up

## 2024-07-11 NOTE — NURSING
Vale spoke with Jocy-manager of patient's group home. Jocy stated that she will be here to  patient for discharge at 1400.

## 2024-07-12 DIAGNOSIS — E11.65 TYPE 2 DIABETES MELLITUS WITH HYPERGLYCEMIA, WITH LONG-TERM CURRENT USE OF INSULIN: ICD-10-CM

## 2024-07-12 DIAGNOSIS — Z79.4 TYPE 2 DIABETES MELLITUS WITH HYPERGLYCEMIA, WITH LONG-TERM CURRENT USE OF INSULIN: ICD-10-CM

## 2024-07-12 DIAGNOSIS — E11.9 TYPE 2 DIABETES MELLITUS WITHOUT COMPLICATION, WITH LONG-TERM CURRENT USE OF INSULIN: ICD-10-CM

## 2024-07-12 DIAGNOSIS — E11.69 HYPERLIPIDEMIA ASSOCIATED WITH TYPE 2 DIABETES MELLITUS: ICD-10-CM

## 2024-07-12 DIAGNOSIS — N32.81 OAB (OVERACTIVE BLADDER): ICD-10-CM

## 2024-07-12 DIAGNOSIS — I15.2 HYPERTENSION ASSOCIATED WITH TYPE 2 DIABETES MELLITUS: ICD-10-CM

## 2024-07-12 DIAGNOSIS — Z79.4 TYPE 2 DIABETES MELLITUS WITHOUT COMPLICATION, WITH LONG-TERM CURRENT USE OF INSULIN: ICD-10-CM

## 2024-07-12 DIAGNOSIS — E66.01 CLASS 3 OBESITY: ICD-10-CM

## 2024-07-12 DIAGNOSIS — E11.59 HYPERTENSION ASSOCIATED WITH TYPE 2 DIABETES MELLITUS: ICD-10-CM

## 2024-07-12 DIAGNOSIS — E78.5 HYPERLIPIDEMIA ASSOCIATED WITH TYPE 2 DIABETES MELLITUS: ICD-10-CM

## 2024-07-12 RX ORDER — TIRZEPATIDE 12.5 MG/.5ML
12.5 INJECTION, SOLUTION SUBCUTANEOUS
Qty: 12 PEN | Refills: 0 | Status: SHIPPED | OUTPATIENT
Start: 2024-07-12

## 2024-07-12 RX ORDER — OXYBUTYNIN CHLORIDE 15 MG/1
15 TABLET, EXTENDED RELEASE ORAL DAILY
Qty: 90 TABLET | Refills: 3 | Status: SHIPPED | OUTPATIENT
Start: 2024-07-12

## 2024-07-12 RX ORDER — TIRZEPATIDE 12.5 MG/.5ML
12.5 INJECTION, SOLUTION SUBCUTANEOUS
COMMUNITY
End: 2024-07-12 | Stop reason: SDUPTHER

## 2024-07-12 RX ORDER — LISINOPRIL 10 MG/1
10 TABLET ORAL DAILY
Qty: 90 TABLET | Refills: 3 | Status: SHIPPED | OUTPATIENT
Start: 2024-07-12 | End: 2025-07-12

## 2024-07-12 RX ORDER — PRAVASTATIN SODIUM 40 MG/1
40 TABLET ORAL NIGHTLY
Qty: 90 TABLET | Refills: 3 | Status: SHIPPED | OUTPATIENT
Start: 2024-07-12

## 2024-07-12 RX ORDER — METOPROLOL SUCCINATE 50 MG/1
50 TABLET, EXTENDED RELEASE ORAL DAILY
Qty: 90 TABLET | Refills: 3 | Status: SHIPPED | OUTPATIENT
Start: 2024-07-12

## 2024-07-12 RX ORDER — LORATADINE 10 MG/1
10 TABLET ORAL DAILY PRN
Qty: 90 TABLET | Refills: 3 | Status: SHIPPED | OUTPATIENT
Start: 2024-07-12 | End: 2025-07-12

## 2024-07-12 RX ORDER — GABAPENTIN 600 MG/1
600 TABLET ORAL 3 TIMES DAILY
Qty: 90 TABLET | Refills: 2 | Status: SHIPPED | OUTPATIENT
Start: 2024-07-12

## 2024-07-12 NOTE — TELEPHONE ENCOUNTER
No care due was identified.  [facility] Embedded Care Due Messages. Reference number: [id number].   7/12/2024 8:27:47 AM CDT

## 2024-07-12 NOTE — TELEPHONE ENCOUNTER
----- Message from Mabel Marques sent at 7/12/2024  8:17 AM CDT -----  Contact: pt [phone]  Type: Needs Medical Advice  Who Called:  Pt     Pharmacy name and phone #:        [facility] - [place], [place] - [address] [facility]  [address] [facility] [address]  Phone: [phone] Fax: [phone]      Best Call Back Number: [phone]    Additional Information: Pt requesting a call back to discuss all her medications needing a refill. Pls call back and advise

## 2024-07-12 NOTE — TELEPHONE ENCOUNTER
Pharmacy change     Upcoming appt on 7/17/24, trazadone and effexor will need to be discussed at appt.

## 2024-07-16 DIAGNOSIS — Z79.4 TYPE 2 DIABETES MELLITUS WITH HYPERGLYCEMIA, WITH LONG-TERM CURRENT USE OF INSULIN: ICD-10-CM

## 2024-07-16 DIAGNOSIS — E11.65 TYPE 2 DIABETES MELLITUS WITH HYPERGLYCEMIA, WITH LONG-TERM CURRENT USE OF INSULIN: ICD-10-CM

## 2024-07-16 RX ORDER — TIRZEPATIDE 12.5 MG/.5ML
12.5 INJECTION, SOLUTION SUBCUTANEOUS
Qty: 12 PEN | Refills: 0 | Status: SHIPPED | OUTPATIENT
Start: 2024-07-16 | End: 2024-07-16

## 2024-07-16 NOTE — TELEPHONE ENCOUNTER
No care due was identified.  [facility] Embedded Care Due Messages. Reference number: [id number].   7/16/2024 10:32:28 AM CDT

## 2024-08-21 ENCOUNTER — TELEPHONE (OUTPATIENT)
Dept: FAMILY MEDICINE | Facility: CLINIC | Age: 54
End: 2024-08-21
Payer: MEDICARE

## 2024-08-21 NOTE — TELEPHONE ENCOUNTER
Called and spoke with patient sister, SERA to reschedule appt on 8/30. She stated she will let her know when she gets back and will call to reschedule.

## 2024-09-04 ENCOUNTER — TELEPHONE (OUTPATIENT)
Dept: ADMINISTRATIVE | Facility: HOSPITAL | Age: 54
End: 2024-09-04
Payer: MEDICARE

## 2024-10-28 NOTE — TELEPHONE ENCOUNTER
No care due was identified.  [facility] Embedded Care Due Messages. Reference number: [id number].   4/26/2024 4:57:00 PM CDT  
[facility] Note  Quick DC. Inappropriate Request   Refill request requires further review by MD: NO   Medication Therapy Plan: Pharmacy is requesting new script(s) for the following medications without required information, (sig/ frequency/qty/etc)     [facility] action(s):  Quick Discontinue      Duplicate Pended Encounter(s)/ Last Prescribed Details:    Pharmacies have been requesting medications for patients without required information, (sig, frequency, qty, etc.). In addition, requests are sent for medication(s) pt. are currently not taking, and medications patients have never taken.    We have spoken to the pharmacies about these request types and advised their teams previously that we are unable to assess these New Script requests and require all details for these requests. This is a known issue and has been reported.        Medication related problems are not assessed for QDC.   Medication Reconciliation Completed? NO Were there pending details that required adjustment? NO     Automatic Epic Generated Protocol Data Below:   Requested Prescriptions   Pending Prescriptions Disp Refills    MOUNJARO 7.5 mg/0.5 mL PnIj [Pharmacy Med Name: MOUNJARO  7.5MG/0.5ML INJ PEN (4X0.5ML)]  0              Appointments      Date Provider   Last Visit   3/15/2024 Ivelisse Garland MD   Next Visit   4/26/2024 Ivelisse Garland MD        Note composed:1:23 PM 04/27/2024        
No
regular

## 2024-11-11 ENCOUNTER — PATIENT OUTREACH (OUTPATIENT)
Dept: ADMINISTRATIVE | Facility: HOSPITAL | Age: 54
End: 2024-11-11
Payer: MEDICARE

## 2024-11-11 NOTE — PROGRESS NOTES
Population Health Chart Review & Patient Outreach Details      Additional [facility] Health Notes:               Updates Requested / Reviewed:      Updated Care Coordination Note         Health Maintenance Topics Overdue:      [facility] Score: 4     Eye Exam  Hemoglobin A1c  Mammogram  LDCT Lung Screen                       Health Maintenance Topic(s) Outreach Outcomes & Actions Taken:    Lab(s) - Outreach Outcomes & Actions Taken  : Left Vm for pt to call back

## 2025-02-19 ENCOUNTER — PATIENT OUTREACH (OUTPATIENT)
Dept: ADMINISTRATIVE | Facility: HOSPITAL | Age: 55
End: 2025-02-19
Payer: MEDICARE

## 2025-02-19 DIAGNOSIS — Z12.31 ENCOUNTER FOR SCREENING MAMMOGRAM FOR BREAST CANCER: Primary | ICD-10-CM

## 2025-03-05 DIAGNOSIS — E11.9 TYPE 2 DIABETES MELLITUS WITHOUT COMPLICATION: ICD-10-CM

## 2025-03-19 LAB — HBA1C MFR BLD: 9.6 % (ref 4–6)

## 2025-04-15 ENCOUNTER — PATIENT OUTREACH (OUTPATIENT)
Dept: ADMINISTRATIVE | Facility: HOSPITAL | Age: 55
End: 2025-04-15
Payer: MEDICARE

## 2025-04-15 NOTE — PROGRESS NOTES
Population Health Chart Review & Patient Outreach Details      Additional [facility] Health Notes:               Updates Requested / Reviewed:      Updated Care Coordination Note, Care Everywhere, , External Sources: [facility], and Immunizations Reconciliation Completed or Queried: [facility] Topics Overdue:      [facility] Score: 6     Urine Screening  Eye Exam  Hemoglobin A1c  Mammogram  Foot Exam  LDCT Lung Screen                       Health Maintenance Topic(s) Outreach Outcomes & Actions Taken:    Lab(s) - Outreach Outcomes & Actions Taken  : External Records Uploaded & Care Team Updated if Applicable    Primary Care Appt - Outreach Outcomes & Actions Taken  : Patient outreach       Refill policies:    • Allow 2-3 business days for refills; controlled substances may take longer.   • Contact your pharmacy at least 5 days prior to running out of medication and have them send an electronic request or submit request through the “request re entire amount billed. Precertification and Prior Authorizations: If your physician has recommended that you have a procedure or additional testing performed.   [facility]) will contact your insurance carrier to obtain pre-certi

## 2025-04-15 NOTE — LETTER
April 15, 2025    Sommer Guevara  [address] [place] [address]              Dear Ms. Guevara:           Dear Sommer Bazzijoanne is committed to your overall health. Periodically we review the health information in your chart to make sure you are up to date on all of your recommended tests and/or procedures.       Our review of your chart shows that you may be due for       [facility] Score: 7     Urine Screening  Eye Exam  Hemoglobin A1c  Lipid Panel  Mammogram  Foot Exam  LDCT Lung Screen  1 Year F/U With Dr Garland                     If you have had any of the above done at another facility, please let us know and we will request a copy of the report to [facility].       At your convenience I would like to speak to you to help get these items scheduled (if needed) and also see if there is anything else we can do to help you. Please send me a message via your patient portal or give me a call at [phone].  I am looking forward to speaking with you soon.     Sincerely,      Anel Flores LPN Care Coordinator  Ivelisse Garland MD and your [facility] Team

## 2025-05-01 ENCOUNTER — TELEPHONE (OUTPATIENT)
Dept: FAMILY MEDICINE | Facility: CLINIC | Age: 55
End: 2025-05-01
Payer: MEDICARE